# Patient Record
Sex: FEMALE | Race: WHITE | NOT HISPANIC OR LATINO
[De-identification: names, ages, dates, MRNs, and addresses within clinical notes are randomized per-mention and may not be internally consistent; named-entity substitution may affect disease eponyms.]

---

## 2017-05-22 PROBLEM — Z00.00 ENCOUNTER FOR PREVENTIVE HEALTH EXAMINATION: Status: ACTIVE | Noted: 2017-05-22

## 2017-06-09 ENCOUNTER — APPOINTMENT (OUTPATIENT)
Dept: GASTROENTEROLOGY | Facility: CLINIC | Age: 57
End: 2017-06-09

## 2017-06-09 VITALS
WEIGHT: 236 LBS | HEART RATE: 99 BPM | BODY MASS INDEX: 37.93 KG/M2 | HEIGHT: 66 IN | DIASTOLIC BLOOD PRESSURE: 72 MMHG | SYSTOLIC BLOOD PRESSURE: 140 MMHG | OXYGEN SATURATION: 97 % | TEMPERATURE: 98.1 F

## 2017-06-09 DIAGNOSIS — Z82.49 FAMILY HISTORY OF ISCHEMIC HEART DISEASE AND OTHER DISEASES OF THE CIRCULATORY SYSTEM: ICD-10-CM

## 2017-06-09 DIAGNOSIS — Z86.69 PERSONAL HISTORY OF OTHER DISEASES OF THE NERVOUS SYSTEM AND SENSE ORGANS: ICD-10-CM

## 2017-06-09 DIAGNOSIS — K64.9 UNSPECIFIED HEMORRHOIDS: ICD-10-CM

## 2017-06-09 DIAGNOSIS — Z80.8 FAMILY HISTORY OF MALIGNANT NEOPLASM OF OTHER ORGANS OR SYSTEMS: ICD-10-CM

## 2017-06-09 DIAGNOSIS — Z80.1 FAMILY HISTORY OF MALIGNANT NEOPLASM OF TRACHEA, BRONCHUS AND LUNG: ICD-10-CM

## 2017-06-09 DIAGNOSIS — Z86.39 PERSONAL HISTORY OF OTHER ENDOCRINE, NUTRITIONAL AND METABOLIC DISEASE: ICD-10-CM

## 2017-06-09 DIAGNOSIS — E01.0 IODINE-DEFICIENCY RELATED DIFFUSE (ENDEMIC) GOITER: ICD-10-CM

## 2017-06-09 DIAGNOSIS — R10.9 UNSPECIFIED ABDOMINAL PAIN: ICD-10-CM

## 2017-06-09 DIAGNOSIS — M54.9 DORSALGIA, UNSPECIFIED: ICD-10-CM

## 2017-06-09 DIAGNOSIS — R68.89 OTHER GENERAL SYMPTOMS AND SIGNS: ICD-10-CM

## 2017-06-09 DIAGNOSIS — Z80.6 FAMILY HISTORY OF LEUKEMIA: ICD-10-CM

## 2017-06-09 DIAGNOSIS — K58.0 IRRITABLE BOWEL SYNDROME WITH DIARRHEA: ICD-10-CM

## 2017-06-09 DIAGNOSIS — R19.5 OTHER FECAL ABNORMALITIES: ICD-10-CM

## 2017-06-09 DIAGNOSIS — R19.4 CHANGE IN BOWEL HABIT: ICD-10-CM

## 2017-06-09 DIAGNOSIS — R09.82 POSTNASAL DRIP: ICD-10-CM

## 2017-06-09 RX ORDER — BISMUTH SUBSALICYLATE 525 MG/1
TABLET ORAL
Refills: 0 | Status: ACTIVE | COMMUNITY

## 2017-06-09 RX ORDER — LEVOCETIRIZINE DIHYDROCHLORIDE 5 MG/1
5 TABLET ORAL
Qty: 30 | Refills: 0 | Status: ACTIVE | COMMUNITY
Start: 2017-01-24

## 2017-06-09 RX ORDER — TRAZODONE HYDROCHLORIDE 150 MG/1
150 TABLET ORAL
Qty: 30 | Refills: 0 | Status: ACTIVE | COMMUNITY
Start: 2017-01-18

## 2017-06-09 RX ORDER — PRAVASTATIN SODIUM 20 MG/1
20 TABLET ORAL
Qty: 30 | Refills: 0 | Status: ACTIVE | COMMUNITY
Start: 2016-12-30

## 2017-06-09 RX ORDER — SERTRALINE HYDROCHLORIDE 100 MG/1
100 TABLET, FILM COATED ORAL
Refills: 0 | Status: ACTIVE | COMMUNITY

## 2017-06-09 RX ORDER — CYCLOBENZAPRINE HYDROCHLORIDE 10 MG/1
10 TABLET, FILM COATED ORAL
Qty: 60 | Refills: 0 | Status: ACTIVE | COMMUNITY
Start: 2016-08-01

## 2017-06-09 RX ORDER — MONTELUKAST 10 MG/1
10 TABLET, FILM COATED ORAL
Qty: 30 | Refills: 0 | Status: ACTIVE | COMMUNITY
Start: 2017-01-24

## 2017-06-12 ENCOUNTER — RESULT REVIEW (OUTPATIENT)
Age: 57
End: 2017-06-12

## 2017-06-13 LAB
ALBUMIN SERPL ELPH-MCNC: 4.4 G/DL
ALP BLD-CCNC: 134 U/L
ALT SERPL-CCNC: 57 U/L
ANION GAP SERPL CALC-SCNC: 18 MMOL/L
AST SERPL-CCNC: 30 U/L
BASOPHILS # BLD AUTO: 0.04 K/UL
BASOPHILS NFR BLD AUTO: 0.4 %
BILIRUB SERPL-MCNC: 0.2 MG/DL
BUN SERPL-MCNC: 11 MG/DL
CALCIUM SERPL-MCNC: 9.9 MG/DL
CHLORIDE SERPL-SCNC: 99 MMOL/L
CO2 SERPL-SCNC: 24 MMOL/L
CREAT SERPL-MCNC: 0.76 MG/DL
EOSINOPHIL # BLD AUTO: 0.42 K/UL
EOSINOPHIL NFR BLD AUTO: 4 %
GLIADIN IGA SER QL: 5.4 UNITS
GLIADIN IGG SER QL: <5 UNITS
GLIADIN PEPTIDE IGA SER-ACNC: NEGATIVE
GLIADIN PEPTIDE IGG SER-ACNC: NEGATIVE
GLUCOSE SERPL-MCNC: 85 MG/DL
HCT VFR BLD CALC: 40.4 %
HGB BLD-MCNC: 12.6 G/DL
IGA SER QL IEP: 210 MG/DL
IMM GRANULOCYTES NFR BLD AUTO: 0.6 %
LYMPHOCYTES # BLD AUTO: 3.14 K/UL
LYMPHOCYTES NFR BLD AUTO: 29.8 %
MAN DIFF?: NORMAL
MCHC RBC-ENTMCNC: 26.8 PG
MCHC RBC-ENTMCNC: 31.2 GM/DL
MCV RBC AUTO: 86 FL
MONOCYTES # BLD AUTO: 0.55 K/UL
MONOCYTES NFR BLD AUTO: 5.2 %
NEUTROPHILS # BLD AUTO: 6.31 K/UL
NEUTROPHILS NFR BLD AUTO: 60 %
PLATELET # BLD AUTO: 451 K/UL
POTASSIUM SERPL-SCNC: 4.3 MMOL/L
PROT SERPL-MCNC: 8.2 G/DL
RBC # BLD: 4.7 M/UL
RBC # FLD: 15.1 %
SODIUM SERPL-SCNC: 141 MMOL/L
TSH SERPL-ACNC: 0.08 UIU/ML
TTG IGA SER IA-ACNC: <5 UNITS
TTG IGA SER-ACNC: NEGATIVE
TTG IGG SER IA-ACNC: <5 UNITS
TTG IGG SER IA-ACNC: NEGATIVE
WBC # FLD AUTO: 10.52 K/UL

## 2017-06-16 ENCOUNTER — OTHER (OUTPATIENT)
Age: 57
End: 2017-06-16

## 2017-06-16 ENCOUNTER — RX RENEWAL (OUTPATIENT)
Age: 57
End: 2017-06-16

## 2017-06-20 ENCOUNTER — APPOINTMENT (OUTPATIENT)
Dept: GASTROENTEROLOGY | Facility: AMBULATORY MEDICAL SERVICES | Age: 57
End: 2017-06-20

## 2017-06-22 ENCOUNTER — LABORATORY RESULT (OUTPATIENT)
Age: 57
End: 2017-06-22

## 2017-06-28 ENCOUNTER — OTHER (OUTPATIENT)
Age: 57
End: 2017-06-28

## 2017-06-28 ENCOUNTER — RESULT REVIEW (OUTPATIENT)
Age: 57
End: 2017-06-28

## 2017-07-05 ENCOUNTER — LABORATORY RESULT (OUTPATIENT)
Age: 57
End: 2017-07-05

## 2017-07-05 ENCOUNTER — APPOINTMENT (OUTPATIENT)
Dept: GASTROENTEROLOGY | Facility: CLINIC | Age: 57
End: 2017-07-05

## 2017-07-05 VITALS
DIASTOLIC BLOOD PRESSURE: 70 MMHG | WEIGHT: 225 LBS | HEIGHT: 65 IN | SYSTOLIC BLOOD PRESSURE: 126 MMHG | TEMPERATURE: 98.6 F | BODY MASS INDEX: 37.49 KG/M2

## 2017-07-05 RX ORDER — DICLOFENAC SODIUM 100 MG/1
100 TABLET, FILM COATED, EXTENDED RELEASE ORAL
Qty: 30 | Refills: 0 | Status: DISCONTINUED | COMMUNITY
Start: 2017-04-12 | End: 2017-07-05

## 2017-07-05 RX ORDER — SODIUM SULFATE, POTASSIUM SULFATE, MAGNESIUM SULFATE 17.5; 3.13; 1.6 G/ML; G/ML; G/ML
17.5-3.13-1.6 SOLUTION, CONCENTRATE ORAL
Qty: 1 | Refills: 0 | Status: DISCONTINUED | COMMUNITY
Start: 2017-06-16 | End: 2017-07-05

## 2017-07-07 LAB
BAKER'S YEAST AB QL: 12.3 UNITS
BAKER'S YEAST IGA QL IA: 13.8 UNITS
BAKER'S YEAST IGA QN IA: NEGATIVE
BAKER'S YEAST IGG QN IA: NEGATIVE
BASOPHILS # BLD AUTO: 0.05 K/UL
BASOPHILS NFR BLD AUTO: 0.5 %
CRP SERPL-MCNC: 1 MG/DL
EOSINOPHIL # BLD AUTO: 0.78 K/UL
EOSINOPHIL NFR BLD AUTO: 7.3 %
ERYTHROCYTE [SEDIMENTATION RATE] IN BLOOD BY WESTERGREN METHOD: 35 MM/HR
HCT VFR BLD CALC: 40 %
HGB BLD-MCNC: 12.8 G/DL
IMM GRANULOCYTES NFR BLD AUTO: 0.2 %
LYMPHOCYTES # BLD AUTO: 3.37 K/UL
LYMPHOCYTES NFR BLD AUTO: 31.6 %
MAN DIFF?: NORMAL
MCHC RBC-ENTMCNC: 27.4 PG
MCHC RBC-ENTMCNC: 32 GM/DL
MCV RBC AUTO: 85.7 FL
MONOCYTES # BLD AUTO: 0.76 K/UL
MONOCYTES NFR BLD AUTO: 7.1 %
MPO AB + PR3 PNL SER: NORMAL
NEUTROPHILS # BLD AUTO: 5.67 K/UL
NEUTROPHILS NFR BLD AUTO: 53.3 %
PLATELET # BLD AUTO: 341 K/UL
RBC # BLD: 4.67 M/UL
RBC # FLD: 14.6 %
WBC # FLD AUTO: 10.65 K/UL

## 2017-08-28 ENCOUNTER — APPOINTMENT (OUTPATIENT)
Dept: GASTROENTEROLOGY | Facility: CLINIC | Age: 57
End: 2017-08-28
Payer: COMMERCIAL

## 2017-08-28 VITALS
DIASTOLIC BLOOD PRESSURE: 80 MMHG | WEIGHT: 227 LBS | HEIGHT: 65.5 IN | BODY MASS INDEX: 37.37 KG/M2 | SYSTOLIC BLOOD PRESSURE: 122 MMHG | TEMPERATURE: 98.3 F

## 2017-08-28 PROCEDURE — 99213 OFFICE O/P EST LOW 20 MIN: CPT

## 2017-08-28 RX ORDER — MESALAMINE 1.2 G/1
1.2 TABLET, DELAYED RELEASE ORAL DAILY
Qty: 360 | Refills: 1 | Status: DISCONTINUED | COMMUNITY
Start: 2017-07-05 | End: 2017-08-28

## 2017-09-18 ENCOUNTER — RX RENEWAL (OUTPATIENT)
Age: 57
End: 2017-09-18

## 2017-10-16 ENCOUNTER — RX RENEWAL (OUTPATIENT)
Age: 57
End: 2017-10-16

## 2017-10-24 ENCOUNTER — APPOINTMENT (OUTPATIENT)
Dept: GASTROENTEROLOGY | Facility: CLINIC | Age: 57
End: 2017-10-24

## 2017-11-01 ENCOUNTER — APPOINTMENT (OUTPATIENT)
Dept: GASTROENTEROLOGY | Facility: CLINIC | Age: 57
End: 2017-11-01

## 2017-11-28 ENCOUNTER — APPOINTMENT (OUTPATIENT)
Dept: GASTROENTEROLOGY | Facility: CLINIC | Age: 57
End: 2017-11-28
Payer: COMMERCIAL

## 2017-11-28 VITALS
WEIGHT: 239 LBS | DIASTOLIC BLOOD PRESSURE: 80 MMHG | HEIGHT: 65.5 IN | BODY MASS INDEX: 39.34 KG/M2 | TEMPERATURE: 98.8 F | SYSTOLIC BLOOD PRESSURE: 126 MMHG

## 2017-11-28 DIAGNOSIS — R19.7 DIARRHEA, UNSPECIFIED: ICD-10-CM

## 2017-11-28 PROCEDURE — 36415 COLL VENOUS BLD VENIPUNCTURE: CPT

## 2017-11-28 PROCEDURE — 99214 OFFICE O/P EST MOD 30 MIN: CPT | Mod: 25

## 2017-11-29 LAB
BASOPHILS # BLD AUTO: 0.04 K/UL
BASOPHILS NFR BLD AUTO: 0.5 %
CRP SERPL-MCNC: 4.3 MG/DL
EOSINOPHIL # BLD AUTO: 0.53 K/UL
EOSINOPHIL NFR BLD AUTO: 6.8 %
ERYTHROCYTE [SEDIMENTATION RATE] IN BLOOD BY WESTERGREN METHOD: 45 MM/HR
HCT VFR BLD CALC: 37.9 %
HGB BLD-MCNC: 12.2 G/DL
IMM GRANULOCYTES NFR BLD AUTO: 0.3 %
LYMPHOCYTES # BLD AUTO: 2.36 K/UL
LYMPHOCYTES NFR BLD AUTO: 30.2 %
MAN DIFF?: NORMAL
MCHC RBC-ENTMCNC: 26.9 PG
MCHC RBC-ENTMCNC: 32.2 GM/DL
MCV RBC AUTO: 83.7 FL
MONOCYTES # BLD AUTO: 0.72 K/UL
MONOCYTES NFR BLD AUTO: 9.2 %
NEUTROPHILS # BLD AUTO: 4.14 K/UL
NEUTROPHILS NFR BLD AUTO: 53 %
PLATELET # BLD AUTO: 403 K/UL
RBC # BLD: 4.53 M/UL
RBC # FLD: 14.9 %
TSH SERPL-ACNC: 0.08 UIU/ML
WBC # FLD AUTO: 7.81 K/UL

## 2017-11-30 ENCOUNTER — RESULT REVIEW (OUTPATIENT)
Age: 57
End: 2017-11-30

## 2017-12-01 ENCOUNTER — RESULT REVIEW (OUTPATIENT)
Age: 57
End: 2017-12-01

## 2017-12-06 LAB
C DIFF TOX GENS STL QL NAA+PROBE: NORMAL
CALPROTECTIN FECAL: 305 UG/G
CDIFF BY PCR: NOT DETECTED
GI PCR PANEL, STOOL: NORMAL

## 2017-12-07 LAB — LACTOFERRIN STL-MCNC: 83

## 2017-12-18 ENCOUNTER — MESSAGE (OUTPATIENT)
Age: 57
End: 2017-12-18

## 2017-12-19 ENCOUNTER — MESSAGE (OUTPATIENT)
Age: 57
End: 2017-12-19

## 2017-12-21 ENCOUNTER — MESSAGE (OUTPATIENT)
Age: 57
End: 2017-12-21

## 2017-12-30 ENCOUNTER — RX RENEWAL (OUTPATIENT)
Age: 57
End: 2017-12-30

## 2018-01-02 ENCOUNTER — APPOINTMENT (OUTPATIENT)
Dept: GASTROENTEROLOGY | Facility: CLINIC | Age: 58
End: 2018-01-02
Payer: COMMERCIAL

## 2018-01-02 VITALS
DIASTOLIC BLOOD PRESSURE: 70 MMHG | OXYGEN SATURATION: 97 % | HEART RATE: 84 BPM | WEIGHT: 239 LBS | BODY MASS INDEX: 39.34 KG/M2 | SYSTOLIC BLOOD PRESSURE: 110 MMHG | TEMPERATURE: 98.5 F | HEIGHT: 65.5 IN

## 2018-01-02 PROCEDURE — 99214 OFFICE O/P EST MOD 30 MIN: CPT

## 2018-01-02 RX ORDER — DICLOFENAC SODIUM 75 MG
TABLET, DELAYED RELEASE (ENTERIC COATED) ORAL
Refills: 0 | Status: DISCONTINUED | COMMUNITY
End: 2018-01-02

## 2018-01-02 RX ORDER — LOPERAMIDE HCL 2 MG
CAPSULE ORAL
Refills: 0 | Status: DISCONTINUED | COMMUNITY
End: 2018-01-02

## 2018-01-02 RX ORDER — PREDNISONE 10 MG/1
10 TABLET ORAL
Qty: 120 | Refills: 1 | Status: DISCONTINUED | COMMUNITY
Start: 2017-12-18 | End: 2018-01-02

## 2018-02-02 ENCOUNTER — APPOINTMENT (OUTPATIENT)
Dept: GASTROENTEROLOGY | Facility: CLINIC | Age: 58
End: 2018-02-02
Payer: COMMERCIAL

## 2018-02-02 ENCOUNTER — LABORATORY RESULT (OUTPATIENT)
Age: 58
End: 2018-02-02

## 2018-02-02 VITALS
SYSTOLIC BLOOD PRESSURE: 120 MMHG | WEIGHT: 240 LBS | OXYGEN SATURATION: 98 % | BODY MASS INDEX: 39.51 KG/M2 | TEMPERATURE: 98.5 F | DIASTOLIC BLOOD PRESSURE: 70 MMHG | HEIGHT: 65.5 IN | HEART RATE: 100 BPM

## 2018-02-02 PROCEDURE — 36415 COLL VENOUS BLD VENIPUNCTURE: CPT

## 2018-02-02 PROCEDURE — 99213 OFFICE O/P EST LOW 20 MIN: CPT | Mod: 25

## 2018-02-02 RX ORDER — METFORMIN HYDROCHLORIDE 1000 MG/1
1000 TABLET, COATED ORAL
Refills: 0 | Status: DISCONTINUED | COMMUNITY
End: 2018-02-02

## 2018-02-02 RX ORDER — LANCETS 33 GAUGE
EACH MISCELLANEOUS
Qty: 100 | Refills: 0 | Status: DISCONTINUED | COMMUNITY
Start: 2016-08-19 | End: 2018-02-02

## 2018-02-02 RX ORDER — NALTREXONE HYDROCHLORIDE 50 MG/1
TABLET, FILM COATED ORAL
Refills: 0 | Status: DISCONTINUED | COMMUNITY
End: 2018-02-02

## 2018-02-02 RX ORDER — BLOOD SUGAR DIAGNOSTIC
STRIP MISCELLANEOUS
Qty: 50 | Refills: 0 | Status: DISCONTINUED | COMMUNITY
Start: 2017-02-07 | End: 2018-02-02

## 2018-02-05 LAB
BASOPHILS # BLD AUTO: 0.04 K/UL
BASOPHILS NFR BLD AUTO: 0.4 %
CRP SERPL-MCNC: 2.1 MG/DL
EOSINOPHIL # BLD AUTO: 0.32 K/UL
EOSINOPHIL NFR BLD AUTO: 3.4 %
ERYTHROCYTE [SEDIMENTATION RATE] IN BLOOD BY WESTERGREN METHOD: 42 MM/HR
FERRITIN SERPL-MCNC: 64 NG/ML
FOLATE SERPL-MCNC: 16.8 NG/ML
HCT VFR BLD CALC: 41.3 %
HGB BLD-MCNC: 12.5 G/DL
IMM GRANULOCYTES NFR BLD AUTO: 0.1 %
IRON SATN MFR SERPL: 12 %
IRON SERPL-MCNC: 41 UG/DL
LYMPHOCYTES # BLD AUTO: 2.92 K/UL
LYMPHOCYTES NFR BLD AUTO: 30.9 %
MAN DIFF?: NORMAL
MCHC RBC-ENTMCNC: 25.4 PG
MCHC RBC-ENTMCNC: 30.3 GM/DL
MCV RBC AUTO: 83.8 FL
MONOCYTES # BLD AUTO: 0.56 K/UL
MONOCYTES NFR BLD AUTO: 5.9 %
NEUTROPHILS # BLD AUTO: 5.59 K/UL
NEUTROPHILS NFR BLD AUTO: 59.3 %
PLATELET # BLD AUTO: 383 K/UL
RBC # BLD: 4.93 M/UL
RBC # FLD: 15.1 %
TIBC SERPL-MCNC: 331 UG/DL
UIBC SERPL-MCNC: 290 UG/DL
VIT B12 SERPL-MCNC: 392 PG/ML
WBC # FLD AUTO: 9.44 K/UL

## 2018-06-18 ENCOUNTER — APPOINTMENT (OUTPATIENT)
Dept: GASTROENTEROLOGY | Facility: CLINIC | Age: 58
End: 2018-06-18
Payer: COMMERCIAL

## 2018-06-18 VITALS
TEMPERATURE: 98.5 F | SYSTOLIC BLOOD PRESSURE: 140 MMHG | BODY MASS INDEX: 40 KG/M2 | HEIGHT: 65.5 IN | WEIGHT: 243 LBS | DIASTOLIC BLOOD PRESSURE: 60 MMHG | OXYGEN SATURATION: 96 % | HEART RATE: 90 BPM

## 2018-06-18 PROCEDURE — 36415 COLL VENOUS BLD VENIPUNCTURE: CPT

## 2018-06-18 PROCEDURE — 99213 OFFICE O/P EST LOW 20 MIN: CPT | Mod: 25

## 2018-06-18 RX ORDER — METFORMIN ER 500 MG 500 MG/1
500 TABLET ORAL
Qty: 120 | Refills: 0 | Status: DISCONTINUED | COMMUNITY
Start: 2017-12-08 | End: 2018-06-18

## 2018-06-18 RX ORDER — MULTIVITAMIN
TABLET ORAL
Refills: 0 | Status: ACTIVE | COMMUNITY

## 2018-06-19 LAB
ALBUMIN SERPL ELPH-MCNC: 4.2 G/DL
ALP BLD-CCNC: 93 U/L
ALT SERPL-CCNC: 18 U/L
ANION GAP SERPL CALC-SCNC: 14 MMOL/L
AST SERPL-CCNC: 23 U/L
BASOPHILS # BLD AUTO: 0.05 K/UL
BASOPHILS NFR BLD AUTO: 0.6 %
BILIRUB SERPL-MCNC: 0.3 MG/DL
BUN SERPL-MCNC: 9 MG/DL
CALCIUM SERPL-MCNC: 10.2 MG/DL
CHLORIDE SERPL-SCNC: 105 MMOL/L
CO2 SERPL-SCNC: 28 MMOL/L
CREAT SERPL-MCNC: 0.9 MG/DL
CRP SERPL-MCNC: 3.9 MG/DL
EOSINOPHIL # BLD AUTO: 0.35 K/UL
EOSINOPHIL NFR BLD AUTO: 3.9 %
ERYTHROCYTE [SEDIMENTATION RATE] IN BLOOD BY WESTERGREN METHOD: 48 MM/HR
FERRITIN SERPL-MCNC: 53 NG/ML
FOLATE SERPL-MCNC: >20 NG/ML
GLUCOSE SERPL-MCNC: 84 MG/DL
HCT VFR BLD CALC: 41.9 %
HGB BLD-MCNC: 12.5 G/DL
IMM GRANULOCYTES NFR BLD AUTO: 0.2 %
IRON SATN MFR SERPL: 12 %
IRON SERPL-MCNC: 35 UG/DL
LYMPHOCYTES # BLD AUTO: 2.99 K/UL
LYMPHOCYTES NFR BLD AUTO: 33 %
MAN DIFF?: NORMAL
MCHC RBC-ENTMCNC: 25 PG
MCHC RBC-ENTMCNC: 29.8 GM/DL
MCV RBC AUTO: 83.8 FL
MONOCYTES # BLD AUTO: 0.74 K/UL
MONOCYTES NFR BLD AUTO: 8.2 %
NEUTROPHILS # BLD AUTO: 4.92 K/UL
NEUTROPHILS NFR BLD AUTO: 54.1 %
PLATELET # BLD AUTO: 379 K/UL
POTASSIUM SERPL-SCNC: 5.1 MMOL/L
PROT SERPL-MCNC: 8.1 G/DL
RBC # BLD: 5 M/UL
RBC # FLD: 16.9 %
SODIUM SERPL-SCNC: 147 MMOL/L
TIBC SERPL-MCNC: 297 UG/DL
UIBC SERPL-MCNC: 262 UG/DL
VIT B12 SERPL-MCNC: 617 PG/ML
WBC # FLD AUTO: 9.07 K/UL

## 2018-07-18 ENCOUNTER — APPOINTMENT (OUTPATIENT)
Dept: GASTROENTEROLOGY | Facility: AMBULATORY MEDICAL SERVICES | Age: 58
End: 2018-07-18
Payer: COMMERCIAL

## 2018-07-18 PROCEDURE — 45380 COLONOSCOPY AND BIOPSY: CPT

## 2018-08-31 ENCOUNTER — APPOINTMENT (OUTPATIENT)
Dept: GASTROENTEROLOGY | Facility: CLINIC | Age: 58
End: 2018-08-31
Payer: COMMERCIAL

## 2018-08-31 VITALS
OXYGEN SATURATION: 98 % | WEIGHT: 254 LBS | SYSTOLIC BLOOD PRESSURE: 142 MMHG | TEMPERATURE: 98.9 F | BODY MASS INDEX: 41.81 KG/M2 | HEART RATE: 92 BPM | HEIGHT: 65.5 IN | DIASTOLIC BLOOD PRESSURE: 80 MMHG

## 2018-08-31 PROCEDURE — 99215 OFFICE O/P EST HI 40 MIN: CPT | Mod: 25

## 2018-08-31 PROCEDURE — 36415 COLL VENOUS BLD VENIPUNCTURE: CPT

## 2018-08-31 RX ORDER — SODIUM SULFATE, POTASSIUM SULFATE, MAGNESIUM SULFATE 17.5; 3.13; 1.6 G/ML; G/ML; G/ML
17.5-3.13-1.6 SOLUTION, CONCENTRATE ORAL
Qty: 1 | Refills: 0 | Status: DISCONTINUED | COMMUNITY
Start: 2018-06-18 | End: 2018-08-31

## 2018-09-05 LAB
ALBUMIN SERPL ELPH-MCNC: 4.2 G/DL
ALP BLD-CCNC: 100 U/L
ALT SERPL-CCNC: 22 U/L
ANION GAP SERPL CALC-SCNC: 14 MMOL/L
AST SERPL-CCNC: 27 U/L
BASOPHILS # BLD AUTO: 0.04 K/UL
BASOPHILS NFR BLD AUTO: 0.4 %
BILIRUB SERPL-MCNC: 0.2 MG/DL
BUN SERPL-MCNC: 11 MG/DL
CALCIUM SERPL-MCNC: 9.9 MG/DL
CHLORIDE SERPL-SCNC: 100 MMOL/L
CO2 SERPL-SCNC: 29 MMOL/L
CREAT SERPL-MCNC: 0.75 MG/DL
CRP SERPL-MCNC: 2.13 MG/DL
EOSINOPHIL # BLD AUTO: 0.43 K/UL
EOSINOPHIL NFR BLD AUTO: 4.6 %
ERYTHROCYTE [SEDIMENTATION RATE] IN BLOOD BY WESTERGREN METHOD: 34 MM/HR
FERRITIN SERPL-MCNC: 33 NG/ML
FOLATE SERPL-MCNC: >20 NG/ML
GLUCOSE SERPL-MCNC: 105 MG/DL
HBV CORE IGG+IGM SER QL: NONREACTIVE
HBV SURFACE AB SER QL: NONREACTIVE
HBV SURFACE AG SER QL: NONREACTIVE
HCT VFR BLD CALC: 44.3 %
HCV AB SER QL: NONREACTIVE
HCV S/CO RATIO: 0.15 S/CO
HGB BLD-MCNC: 13.7 G/DL
IMM GRANULOCYTES NFR BLD AUTO: 0.2 %
IRON SATN MFR SERPL: 9 %
IRON SERPL-MCNC: 36 UG/DL
LYMPHOCYTES # BLD AUTO: 2.81 K/UL
LYMPHOCYTES NFR BLD AUTO: 29.9 %
M TB IFN-G BLD-IMP: NEGATIVE
MAN DIFF?: NORMAL
MCHC RBC-ENTMCNC: 25.8 PG
MCHC RBC-ENTMCNC: 30.9 GM/DL
MCV RBC AUTO: 83.4 FL
MONOCYTES # BLD AUTO: 0.61 K/UL
MONOCYTES NFR BLD AUTO: 6.5 %
NEUTROPHILS # BLD AUTO: 5.48 K/UL
NEUTROPHILS NFR BLD AUTO: 58.4 %
PLATELET # BLD AUTO: 338 K/UL
POTASSIUM SERPL-SCNC: 4.4 MMOL/L
PROT SERPL-MCNC: 8.2 G/DL
QUANTIFERON TB PLUS MITOGEN MINUS NIL: 8.61 IU/ML
QUANTIFERON TB PLUS NIL: 0.02 IU/ML
QUANTIFERON TB PLUS TB1 MINUS NIL: 0 IU/ML
QUANTIFERON TB PLUS TB2 MINUS NIL: 0 IU/ML
RBC # BLD: 5.31 M/UL
RBC # FLD: 16.6 %
SODIUM SERPL-SCNC: 143 MMOL/L
TIBC SERPL-MCNC: 387 UG/DL
UIBC SERPL-MCNC: 351 UG/DL
VIT B12 SERPL-MCNC: 528 PG/ML
WBC # FLD AUTO: 9.39 K/UL

## 2018-10-19 ENCOUNTER — APPOINTMENT (OUTPATIENT)
Dept: GASTROENTEROLOGY | Facility: CLINIC | Age: 58
End: 2018-10-19
Payer: COMMERCIAL

## 2018-10-19 VITALS
BODY MASS INDEX: 42.8 KG/M2 | WEIGHT: 260 LBS | OXYGEN SATURATION: 97 % | SYSTOLIC BLOOD PRESSURE: 118 MMHG | HEIGHT: 65.5 IN | DIASTOLIC BLOOD PRESSURE: 70 MMHG | HEART RATE: 90 BPM | TEMPERATURE: 98.3 F

## 2018-10-19 DIAGNOSIS — R14.0 ABDOMINAL DISTENSION (GASEOUS): ICD-10-CM

## 2018-10-19 DIAGNOSIS — R19.4 CHANGE IN BOWEL HABIT: ICD-10-CM

## 2018-10-19 PROCEDURE — 99215 OFFICE O/P EST HI 40 MIN: CPT

## 2018-10-19 RX ORDER — EMPAGLIFLOZIN 10 MG/1
10 TABLET, FILM COATED ORAL
Refills: 0 | Status: DISCONTINUED | COMMUNITY
End: 2018-10-19

## 2018-10-23 ENCOUNTER — MESSAGE (OUTPATIENT)
Age: 58
End: 2018-10-23

## 2018-10-24 ENCOUNTER — FORM ENCOUNTER (OUTPATIENT)
Age: 58
End: 2018-10-24

## 2018-10-25 ENCOUNTER — OUTPATIENT (OUTPATIENT)
Dept: OUTPATIENT SERVICES | Facility: HOSPITAL | Age: 58
LOS: 1 days | End: 2018-10-25
Payer: COMMERCIAL

## 2018-10-25 ENCOUNTER — APPOINTMENT (OUTPATIENT)
Dept: CT IMAGING | Facility: CLINIC | Age: 58
End: 2018-10-25
Payer: COMMERCIAL

## 2018-10-25 DIAGNOSIS — Z00.8 ENCOUNTER FOR OTHER GENERAL EXAMINATION: ICD-10-CM

## 2018-10-25 PROCEDURE — 74177 CT ABD & PELVIS W/CONTRAST: CPT

## 2018-10-25 PROCEDURE — 82565 ASSAY OF CREATININE: CPT

## 2018-10-25 PROCEDURE — 74177 CT ABD & PELVIS W/CONTRAST: CPT | Mod: 26

## 2018-10-29 ENCOUNTER — MESSAGE (OUTPATIENT)
Age: 58
End: 2018-10-29

## 2018-10-30 ENCOUNTER — MESSAGE (OUTPATIENT)
Age: 58
End: 2018-10-30

## 2018-11-17 ENCOUNTER — RX RENEWAL (OUTPATIENT)
Age: 58
End: 2018-11-17

## 2018-11-19 ENCOUNTER — APPOINTMENT (OUTPATIENT)
Dept: GASTROENTEROLOGY | Facility: CLINIC | Age: 58
End: 2018-11-19
Payer: COMMERCIAL

## 2018-11-19 VITALS
TEMPERATURE: 98.51 F | HEART RATE: 101 BPM | HEIGHT: 65.5 IN | OXYGEN SATURATION: 94 % | SYSTOLIC BLOOD PRESSURE: 120 MMHG | DIASTOLIC BLOOD PRESSURE: 70 MMHG

## 2018-11-19 PROCEDURE — 36415 COLL VENOUS BLD VENIPUNCTURE: CPT

## 2018-11-19 PROCEDURE — 99213 OFFICE O/P EST LOW 20 MIN: CPT | Mod: 25

## 2018-11-21 LAB
ALBUMIN SERPL ELPH-MCNC: 4.3 G/DL
ALP BLD-CCNC: 96 U/L
ALT SERPL-CCNC: 37 U/L
ANION GAP SERPL CALC-SCNC: 12 MMOL/L
AST SERPL-CCNC: 37 U/L
BASOPHILS # BLD AUTO: 0.03 K/UL
BASOPHILS NFR BLD AUTO: 0.3 %
BILIRUB SERPL-MCNC: 0.2 MG/DL
BUN SERPL-MCNC: 8 MG/DL
CALCIUM SERPL-MCNC: 9.5 MG/DL
CHLORIDE SERPL-SCNC: 101 MMOL/L
CO2 SERPL-SCNC: 28 MMOL/L
CREAT SERPL-MCNC: 0.63 MG/DL
CRP SERPL-MCNC: 1.57 MG/DL
EOSINOPHIL # BLD AUTO: 0.34 K/UL
EOSINOPHIL NFR BLD AUTO: 3.8 %
ERYTHROCYTE [SEDIMENTATION RATE] IN BLOOD BY WESTERGREN METHOD: 27 MM/HR
FERRITIN SERPL-MCNC: 84 NG/ML
FOLATE SERPL-MCNC: >20 NG/ML
GLUCOSE SERPL-MCNC: 145 MG/DL
HCT VFR BLD CALC: 42.8 %
HGB BLD-MCNC: 12.9 G/DL
IMM GRANULOCYTES NFR BLD AUTO: 0.2 %
IRON SATN MFR SERPL: 14 %
IRON SERPL-MCNC: 46 UG/DL
LYMPHOCYTES # BLD AUTO: 3.08 K/UL
LYMPHOCYTES NFR BLD AUTO: 34.1 %
MAN DIFF?: NORMAL
MCHC RBC-ENTMCNC: 25.4 PG
MCHC RBC-ENTMCNC: 30.1 GM/DL
MCV RBC AUTO: 84.3 FL
MONOCYTES # BLD AUTO: 0.56 K/UL
MONOCYTES NFR BLD AUTO: 6.2 %
NEUTROPHILS # BLD AUTO: 5.01 K/UL
NEUTROPHILS NFR BLD AUTO: 55.4 %
PLATELET # BLD AUTO: 274 K/UL
POTASSIUM SERPL-SCNC: 3.9 MMOL/L
PROT SERPL-MCNC: 7.8 G/DL
RBC # BLD: 5.08 M/UL
RBC # FLD: 17 %
SODIUM SERPL-SCNC: 140 MMOL/L
TIBC SERPL-MCNC: 337 UG/DL
UIBC SERPL-MCNC: 291 UG/DL
VIT B12 SERPL-MCNC: 579 PG/ML
WBC # FLD AUTO: 9.04 K/UL

## 2019-04-10 ENCOUNTER — APPOINTMENT (OUTPATIENT)
Dept: GASTROENTEROLOGY | Facility: CLINIC | Age: 59
End: 2019-04-10
Payer: COMMERCIAL

## 2019-04-10 VITALS
OXYGEN SATURATION: 97 % | BODY MASS INDEX: 42.8 KG/M2 | SYSTOLIC BLOOD PRESSURE: 110 MMHG | WEIGHT: 260 LBS | HEART RATE: 93 BPM | HEIGHT: 65.5 IN | TEMPERATURE: 98.2 F | DIASTOLIC BLOOD PRESSURE: 68 MMHG

## 2019-04-10 PROCEDURE — 99213 OFFICE O/P EST LOW 20 MIN: CPT

## 2019-04-10 RX ORDER — ADALIMUMAB 80MG/0.8ML
80 KIT SUBCUTANEOUS
Qty: 1 | Refills: 0 | Status: DISCONTINUED | COMMUNITY
Start: 2018-10-19 | End: 2019-04-10

## 2019-04-10 RX ORDER — TRAMADOL HYDROCHLORIDE 25 MG/1
TABLET, COATED ORAL
Refills: 0 | Status: DISCONTINUED | COMMUNITY
End: 2019-04-10

## 2019-04-10 RX ORDER — BUDESONIDE 1 MG/2ML
INHALANT ORAL
Refills: 0 | Status: DISCONTINUED | COMMUNITY
End: 2019-04-10

## 2019-04-10 RX ORDER — BUDESONIDE 32 UG/1
32 SPRAY, METERED NASAL
Refills: 0 | Status: ACTIVE | COMMUNITY

## 2019-04-10 RX ORDER — FLUTICASONE PROPIONATE 50 UG/1
50 SPRAY, METERED NASAL
Qty: 16 | Refills: 0 | Status: DISCONTINUED | COMMUNITY
Start: 2018-03-16 | End: 2019-04-10

## 2019-04-14 NOTE — PHYSICAL EXAM
[General Appearance - In No Acute Distress] : in no acute distress [General Appearance - Alert] : alert [Neck Appearance] : the appearance of the neck was normal [Neck Cervical Mass (___cm)] : no neck mass was observed [Jugular Venous Distention Increased] : there was no jugular-venous distention [Thyroid Diffuse Enlargement] : the thyroid was not enlarged [Thyroid Nodule] : there were no palpable thyroid nodules [Auscultation Breath Sounds / Voice Sounds] : lungs were clear to auscultation bilaterally [Heart Rate And Rhythm] : heart rate was normal and rhythm regular [Murmurs] : no murmurs [Heart Sounds Gallop] : no gallops [Heart Sounds] : normal S1 and S2 [Heart Sounds Pericardial Friction Rub] : no pericardial rub [Edema] : there was no peripheral edema [Bowel Sounds] : normal bowel sounds [Abdomen Soft] : soft [Abdomen Mass (___ Cm)] : no abdominal mass palpated [] : no hepato-splenomegaly [No CVA Tenderness] : no ~M costovertebral angle tenderness [No Spinal Tenderness] : no spinal tenderness [Affect] : the affect was normal [Oriented To Time, Place, And Person] : oriented to person, place, and time [Impaired Insight] : insight and judgment were intact [FreeTextEntry1] : obese, mild LLQ tenderness

## 2019-04-14 NOTE — CONSULT LETTER
[FreeTextEntry1] : Dear Dr. Shelton Leavitt ,\par \par I had the pleasure of seeing your patient MARY ALICE REMY in the office today.  My office note is attached.\par \par Thank you very much for allowing me to participate in the care of your patient.\par \par Sincerely,\par \par Vince Lizarraga M.D., FACG, FACP\par Director, Celiac Program at [facility]\par  of Medicine\par [employer] at [facility] Practice Director,\par [facility] Gastroenterology/Internal Medicine at [place]\par [address]\[place] [address]\par Tel: [phone]\par Email: [email or web address]

## 2019-04-14 NOTE — REVIEW OF SYSTEMS
[As Noted in HPI] : as noted in HPI [Negative] : Heme/Lymph [FreeTextEntry4] : current sinus infection [FreeTextEntry9] : lower back pain

## 2019-04-14 NOTE — ASSESSMENT
[FreeTextEntry1] : Patient with Crohn's colitis on Humira.\par \par We will draw labs at trough levels of Humira in one and a half weeks. These will include Humira levels and antibodies, CBC, Chem-matthias, TSH, ESR, C-reactive protein, iron studies, B12, folate, vitamin D.\par \par I have renewed hydrocortisone cream for her hemorrhoids.\par \par Patient will return to see me in July. She is due for a colonoscopy at that time.\par \par \par Note from 11/19/18 - Patient with Crohn's colitis who has begun Humira. She is currently doing well.\par \par Patient will continue her current medications.\par \par Blood work was sent for CBC, chem pack, sedimentation rate, C-reactive protein, iron studies, B12, folate.\par \par Patient will return to see me in 3 months.\par \par \par Plan from 10/19/18 - Patient with Crohn's colitis with active inflammation on previous colonoscopy and elevated inflammatory markers on recent blood work. She has had a worsening of her symptoms. At her last visit, we had discussed the possibility of beginning a biologic treatment but she decided to hold off at that time. The patient reports that her gynecologist had concerns regarding impact of the Crohn's disease on gynecologic organs although the patient has no symptoms referable to the urinary tract or any symptoms of fecal output vaginally. The patient has left lower quadrant tenderness on exam.\par \par A long discussion with the patient regarding biologic use. We agreed to begin Humira after discussion regarding the possible risks. We have begun the process of establishing the patient with a specialty pharmacy and beginning insurance verification.\par \par Patient was sent for a CT scan of the abdomen and pelvis to rule out any fistula or abscess.\par \par \par Plan from 8/31/18 - Patient with evidence of Crohn's colitis on colonoscopy with the presence of granulomas on multiple biopsies. The patient is doing well clinically on Apriso bypass active inflammation throughout the colon and elevated inflammatory markers on blood work.\par \par I had a long detailed discussion with the patient regarding our options at this point. We could choose to continue Apriso and altered treatment only if her symptoms change or she showed evidence of a flareup. Alternatively, we can consider a biologic as the patient does have active inflammation.\par \par Blood work was sent for CBC, chem PAC, sedimentation rate, C-reactive protein, B12, folate, iron studies, hepatitis B. and C. serologies, quantiferrin gold.\par \par At the present time, the patient will continue Apriso.\par \par We will repeat a colonoscopy in one year that is July 2019.\par \par Patient will return to see me in 2 months.\par \par \par Plan from 6/18/18 - Patient with ulcerative colitis doing well clinically.\par \par A colonoscopy has been scheduled. The risks, benefits, alternatives, and limitations of the procedure, including the possibility of missed lesions, were explained.\par \par Blood work was sent for CBC, chem pack, sedimentation rate, C-reactive protein, iron studies, B12, folate.\par \par \par Plan from 2/2/18 - Patient doing better after a flareup of her ulcerative colitis. She did not require prednisone. She is now doing well on Apriso.\par \par Patient will continue Apriso 4 tablets a day.\par \par Blood work was sent for CBC, chem pack, sedimentation rate, C-reactive protein, iron studies, B12, folate.\par \par Patient will return to see me in 3 months. She is due for colonoscopy in June.\par \par \par Plan from 1/2/18 - Patient with a flareup of ulcerative colitis. She is starting to improve on Apriso. It is possible that the patient's hyperthyroidism is partially contributing to the diarrhea symptoms.\par \par Patient will continue Apriso for tablets a day.\par \par At this point, the patient was advised to stay off of prednisone.\par \par Patient was given hydrocortisone cream 2.5% to apply to hemorrhoids.\par \par Patient was advised to proceed with radioactive iodine to treat the hyperthyroidism.\par \par Patient will return to see me in one month. We will plan on a colonoscopy in June.\par \par \par Plan from 11/28/17 - Patient with probable ulcerative colitis who has symptoms of diarrhea with bleeding despite being on Lialda.  \par \par Stool studies will be sent for PCR testing, C. diff, lactoferrin, and calprotectin.\par \par Blood work was sent for CBC, sedimentation rate, C-reactive protein, TFTs.\par \par If the above are consistent with ulcerative colitis, the patient will require a short course of steroids.\par \par \par Plan from 8/28/17 - Patient with a pancolitis on colonoscopy. We do not have a clear understanding of whether or not she has true inflammatory bowel disease. Her markers are negative.\par \par Patient will continue on Lialda 4.8 g a day.\par \par Patient will return to see me in one month.\par \par Colonoscopy in June 2018.\par \par \par Plan from 7/5/17 - Patient with pancolitis on colonoscopy. The biopsy findings along with the positive calprotectin and lactoferrin suggests the possibility of IBD. The patient had mild elevation of her LFTs but these have normalized.\par \par Blood work was sent for IBD serology, CBC, sedimentation rate, C-reactive protein.\par \par Patient was started on Lialda 4.8 g a day.\par \par We will repeat a colonoscopy in one year that is June 2018.\par \par Patient has been following with her endocrinologist regarding her hyperthyroidism.\par \par Patient will return to see me in one month.

## 2019-04-22 ENCOUNTER — LABORATORY RESULT (OUTPATIENT)
Age: 59
End: 2019-04-22

## 2019-04-29 LAB
25(OH)D3 SERPL-MCNC: 14.3 NG/ML
ALBUMIN SERPL ELPH-MCNC: 4 G/DL
ALP BLD-CCNC: 110 U/L
ALT SERPL-CCNC: 26 U/L
ANION GAP SERPL CALC-SCNC: 12 MMOL/L
ANTIBODIES TO ADALIMUMAB (ATA) CONCENTRATION: 19.6 U/ML
AST SERPL-CCNC: 29 U/L
BASOPHILS # BLD AUTO: 0.07 K/UL
BASOPHILS NFR BLD AUTO: 0.7 %
BILIRUB SERPL-MCNC: 0.2 MG/DL
BUN SERPL-MCNC: 8 MG/DL
CALCIUM SERPL-MCNC: 9.4 MG/DL
CHLORIDE SERPL-SCNC: 101 MMOL/L
CO2 SERPL-SCNC: 27 MMOL/L
CREAT SERPL-MCNC: 0.6 MG/DL
CRP SERPL-MCNC: 3.14 MG/DL
EOSINOPHIL # BLD AUTO: 0.43 K/UL
EOSINOPHIL NFR BLD AUTO: 4 %
ERYTHROCYTE [SEDIMENTATION RATE] IN BLOOD BY WESTERGREN METHOD: 39 MM/HR
FERRITIN SERPL-MCNC: 118 NG/ML
FOLATE SERPL-MCNC: >20 NG/ML
GLUCOSE SERPL-MCNC: 182 MG/DL
HCT VFR BLD CALC: 43.4 %
HGB BLD-MCNC: 13.2 G/DL
IMM GRANULOCYTES NFR BLD AUTO: 0.3 %
IRON SATN MFR SERPL: 13 %
IRON SERPL-MCNC: 41 UG/DL
LYMPHOCYTES # BLD AUTO: 3.26 K/UL
LYMPHOCYTES NFR BLD AUTO: 30.7 %
MAN DIFF?: NORMAL
MCHC RBC-ENTMCNC: 26 PG
MCHC RBC-ENTMCNC: 30.4 GM/DL
MCV RBC AUTO: 85.4 FL
MONOCYTES # BLD AUTO: 0.79 K/UL
MONOCYTES NFR BLD AUTO: 7.4 %
NEUTROPHILS # BLD AUTO: 6.05 K/UL
NEUTROPHILS NFR BLD AUTO: 56.9 %
PLATELET # BLD AUTO: 309 K/UL
POTASSIUM SERPL-SCNC: 4.3 MMOL/L
PROMETHEUS ANSER ADA: NORMAL
PROMETHEUS LABORATORY FOOTER: NORMAL
PROT SERPL-MCNC: 7.4 G/DL
RBC # BLD: 5.08 M/UL
RBC # FLD: 14.4 %
SERUM ADALIMUMAB (ADA) CONCENTRATION: < 1.6 UG/ML
SODIUM SERPL-SCNC: 140 MMOL/L
TIBC SERPL-MCNC: 310 UG/DL
TSH SERPL-ACNC: <0.01 UIU/ML
UIBC SERPL-MCNC: 269 UG/DL
VIT B12 SERPL-MCNC: 726 PG/ML
WBC # FLD AUTO: 10.63 K/UL

## 2019-05-01 ENCOUNTER — APPOINTMENT (OUTPATIENT)
Dept: GASTROENTEROLOGY | Facility: CLINIC | Age: 59
End: 2019-05-01
Payer: COMMERCIAL

## 2019-05-01 VITALS
HEIGHT: 65.5 IN | HEART RATE: 101 BPM | OXYGEN SATURATION: 97 % | TEMPERATURE: 98.4 F | DIASTOLIC BLOOD PRESSURE: 86 MMHG | SYSTOLIC BLOOD PRESSURE: 140 MMHG

## 2019-05-01 DIAGNOSIS — R15.2 FECAL URGENCY: ICD-10-CM

## 2019-05-01 PROCEDURE — 99214 OFFICE O/P EST MOD 30 MIN: CPT | Mod: 25

## 2019-05-01 PROCEDURE — 36415 COLL VENOUS BLD VENIPUNCTURE: CPT

## 2019-05-01 NOTE — PHYSICAL EXAM
[General Appearance - Alert] : alert [Neck Appearance] : the appearance of the neck was normal [General Appearance - In No Acute Distress] : in no acute distress [Neck Cervical Mass (___cm)] : no neck mass was observed [Thyroid Diffuse Enlargement] : the thyroid was not enlarged [Jugular Venous Distention Increased] : there was no jugular-venous distention [Auscultation Breath Sounds / Voice Sounds] : lungs were clear to auscultation bilaterally [Thyroid Nodule] : there were no palpable thyroid nodules [Heart Rate And Rhythm] : heart rate was normal and rhythm regular [Heart Sounds] : normal S1 and S2 [Heart Sounds Pericardial Friction Rub] : no pericardial rub [Murmurs] : no murmurs [Heart Sounds Gallop] : no gallops [Edema] : there was no peripheral edema [Bowel Sounds] : normal bowel sounds [Abdomen Mass (___ Cm)] : no abdominal mass palpated [] : no hepato-splenomegaly [Abdomen Soft] : soft [No CVA Tenderness] : no ~M costovertebral angle tenderness [No Spinal Tenderness] : no spinal tenderness [Oriented To Time, Place, And Person] : oriented to person, place, and time [Impaired Insight] : insight and judgment were intact [Affect] : the affect was normal [FreeTextEntry1] : obese, mild LLQ tenderness

## 2019-05-01 NOTE — ASSESSMENT
[FreeTextEntry1] : Patient with Crohn's colitis who has developed antibodies to Humira with undetectable levels. Hand in hand with this, the patient's inflammatory markers are rising. She is doing relatively well clinically although she has fecal urgency.\par \par I had a long discussion with the patient regarding other options. She definitely needs to switch to a different biologic. We agreed to pursue Entyvio infusions. I discussed potential risks.\par \par We will begin the insurance verification process and get the patient connected with an infusion center.\par \par Blood work was sent for quantiferrin gold and hepatitis B and C. serologies.\par \par The patient has started vitamin D 3 2000 international units q.d.\par \par Patient has an appointment with her endocrinologist regarding the very low TSH.\par \par Patient is due for colonoscopy in July.\par \par \par Plan from 4/10/19 - Patient with Crohn's colitis on Humira.\par \par We will draw labs at trough levels of Humira in one and a half weeks. These will include Humira levels and antibodies, CBC, Chem-matthias, TSH, ESR, C-reactive protein, iron studies, B12, folate, vitamin D.\par \par I have renewed hydrocortisone cream for her hemorrhoids.\par \par Patient will return to see me in July. She is due for a colonoscopy at that time.\par \par \par Note from 11/19/18 - Patient with Crohn's colitis who has begun Humira. She is currently doing well.\par \par Patient will continue her current medications.\par \par Blood work was sent for CBC, chem pack, sedimentation rate, C-reactive protein, iron studies, B12, folate.\par \par Patient will return to see me in 3 months.\par \par \par Plan from 10/19/18 - Patient with Crohn's colitis with active inflammation on previous colonoscopy and elevated inflammatory markers on recent blood work. She has had a worsening of her symptoms. At her last visit, we had discussed the possibility of beginning a biologic treatment but she decided to hold off at that time. The patient reports that her gynecologist had concerns regarding impact of the Crohn's disease on gynecologic organs although the patient has no symptoms referable to the urinary tract or any symptoms of fecal output vaginally. The patient has left lower quadrant tenderness on exam.\par \par A long discussion with the patient regarding biologic use. We agreed to begin Humira after discussion regarding the possible risks. We have begun the process of establishing the patient with a specialty pharmacy and beginning insurance verification.\par \par Patient was sent for a CT scan of the abdomen and pelvis to rule out any fistula or abscess.\par \par \par Plan from 8/31/18 - Patient with evidence of Crohn's colitis on colonoscopy with the presence of granulomas on multiple biopsies. The patient is doing well clinically on Apriso bypass active inflammation throughout the colon and elevated inflammatory markers on blood work.\par \par I had a long detailed discussion with the patient regarding our options at this point. We could choose to continue Apriso and altered treatment only if her symptoms change or she showed evidence of a flareup. Alternatively, we can consider a biologic as the patient does have active inflammation.\par \par Blood work was sent for CBC, chem PAC, sedimentation rate, C-reactive protein, B12, folate, iron studies, hepatitis B. and C. serologies, quantiferrin gold.\par \par At the present time, the patient will continue Apriso.\par \par We will repeat a colonoscopy in one year that is July 2019.\par \par Patient will return to see me in 2 months.\par \par \par Plan from 6/18/18 - Patient with ulcerative colitis doing well clinically.\par \par A colonoscopy has been scheduled. The risks, benefits, alternatives, and limitations of the procedure, including the possibility of missed lesions, were explained.\par \par Blood work was sent for CBC, chem pack, sedimentation rate, C-reactive protein, iron studies, B12, folate.\par \par \par Plan from 2/2/18 - Patient doing better after a flareup of her ulcerative colitis. She did not require prednisone. She is now doing well on Apriso.\par \par Patient will continue Apriso 4 tablets a day.\par \par Blood work was sent for CBC, chem pack, sedimentation rate, C-reactive protein, iron studies, B12, folate.\par \par Patient will return to see me in 3 months. She is due for colonoscopy in June.\par \par \par Plan from 1/2/18 - Patient with a flareup of ulcerative colitis. She is starting to improve on Apriso. It is possible that the patient's hyperthyroidism is partially contributing to the diarrhea symptoms.\par \par Patient will continue Apriso for tablets a day.\par \par At this point, the patient was advised to stay off of prednisone.\par \par Patient was given hydrocortisone cream 2.5% to apply to hemorrhoids.\par \par Patient was advised to proceed with radioactive iodine to treat the hyperthyroidism.\par \par Patient will return to see me in one month. We will plan on a colonoscopy in June.\par \par \par Plan from 11/28/17 - Patient with probable ulcerative colitis who has symptoms of diarrhea with bleeding despite being on Lialda.  \par \par Stool studies will be sent for PCR testing, C. diff, lactoferrin, and calprotectin.\par \par Blood work was sent for CBC, sedimentation rate, C-reactive protein, TFTs.\par \par If the above are consistent with ulcerative colitis, the patient will require a short course of steroids.\par \par \par Plan from 8/28/17 - Patient with a pancolitis on colonoscopy. We do not have a clear understanding of whether or not she has true inflammatory bowel disease. Her markers are negative.\par \par Patient will continue on Lialda 4.8 g a day.\par \par Patient will return to see me in one month.\par \par Colonoscopy in June 2018.\par \par \par Plan from 7/5/17 - Patient with pancolitis on colonoscopy. The biopsy findings along with the positive calprotectin and lactoferrin suggests the possibility of IBD. The patient had mild elevation of her LFTs but these have normalized.\par \par Blood work was sent for IBD serology, CBC, sedimentation rate, C-reactive protein.\par \par Patient was started on Lialda 4.8 g a day.\par \par We will repeat a colonoscopy in one year that is June 2018.\par \par Patient has been following with her endocrinologist regarding her hyperthyroidism.\par \par Patient will return to see me in one month.
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
## 2019-05-01 NOTE — CONSULT LETTER
[FreeTextEntry1] : Dear Dr. Shelton Leavitt ,\par \par I had the pleasure of seeing your patient MARY ALICE REMY in the office today.  My office note is attached.\par \par Thank you very much for allowing me to participate in the care of your patient.\par \par Sincerely,\par \par Vince Lizarraga M.D., FACG, FACP\par Director, Celiac Program at [facility]\par  of Medicine\par [employer] at [facility] Practice Director,\par [facility] - Gastroenterology/Internal Medicine at [place]\par [address]\[place] [address]\par Tel: [phone]\par Email: [email or web address]

## 2019-05-04 ENCOUNTER — RX RENEWAL (OUTPATIENT)
Age: 59
End: 2019-05-04

## 2019-05-06 LAB
HBV CORE IGG+IGM SER QL: NONREACTIVE
HBV SURFACE AB SER QL: NONREACTIVE
HBV SURFACE AG SER QL: NONREACTIVE
HCV AB SER QL: NONREACTIVE
HCV S/CO RATIO: 0.11 S/CO
M TB IFN-G BLD-IMP: NEGATIVE
QUANTIFERON TB PLUS MITOGEN MINUS NIL: >10 IU/ML
QUANTIFERON TB PLUS NIL: 0.01 IU/ML
QUANTIFERON TB PLUS TB1 MINUS NIL: 0.01 IU/ML
QUANTIFERON TB PLUS TB2 MINUS NIL: 0.01 IU/ML

## 2019-06-15 ENCOUNTER — MESSAGE (OUTPATIENT)
Age: 59
End: 2019-06-15

## 2019-06-17 ENCOUNTER — MESSAGE (OUTPATIENT)
Age: 59
End: 2019-06-17

## 2019-06-18 ENCOUNTER — RX RENEWAL (OUTPATIENT)
Age: 59
End: 2019-06-18

## 2019-11-13 ENCOUNTER — RX RENEWAL (OUTPATIENT)
Age: 59
End: 2019-11-13

## 2020-01-02 ENCOUNTER — LABORATORY RESULT (OUTPATIENT)
Age: 60
End: 2020-01-02

## 2020-01-02 ENCOUNTER — APPOINTMENT (OUTPATIENT)
Dept: GASTROENTEROLOGY | Facility: CLINIC | Age: 60
End: 2020-01-02
Payer: COMMERCIAL

## 2020-01-02 VITALS
HEART RATE: 107 BPM | BODY MASS INDEX: 40.82 KG/M2 | OXYGEN SATURATION: 95 % | DIASTOLIC BLOOD PRESSURE: 70 MMHG | HEIGHT: 65.5 IN | SYSTOLIC BLOOD PRESSURE: 128 MMHG | WEIGHT: 248 LBS | TEMPERATURE: 98.2 F

## 2020-01-02 PROCEDURE — 36415 COLL VENOUS BLD VENIPUNCTURE: CPT

## 2020-01-02 PROCEDURE — 99214 OFFICE O/P EST MOD 30 MIN: CPT | Mod: 25

## 2020-01-02 RX ORDER — LINAGLIPTIN 5 MG/1
5 TABLET, FILM COATED ORAL
Refills: 0 | Status: DISCONTINUED | COMMUNITY
End: 2020-01-02

## 2020-01-02 RX ORDER — ADALIMUMAB 40MG/0.4ML
40 KIT SUBCUTANEOUS
Qty: 3 | Refills: 1 | Status: DISCONTINUED | COMMUNITY
Start: 2018-10-19 | End: 2020-01-02

## 2020-01-05 LAB
25(OH)D3 SERPL-MCNC: 25.7 NG/ML
ALBUMIN SERPL ELPH-MCNC: 4.1 G/DL
ALP BLD-CCNC: 119 U/L
ALT SERPL-CCNC: 29 U/L
ANION GAP SERPL CALC-SCNC: 18 MMOL/L
AST SERPL-CCNC: 28 U/L
BASOPHILS # BLD AUTO: 0.06 K/UL
BASOPHILS NFR BLD AUTO: 0.7 %
BILIRUB SERPL-MCNC: 0.2 MG/DL
BUN SERPL-MCNC: 9 MG/DL
CALCIUM SERPL-MCNC: 9.8 MG/DL
CHLORIDE SERPL-SCNC: 96 MMOL/L
CO2 SERPL-SCNC: 22 MMOL/L
CREAT SERPL-MCNC: 0.53 MG/DL
CRP SERPL-MCNC: 3.03 MG/DL
EOSINOPHIL # BLD AUTO: 0.25 K/UL
EOSINOPHIL NFR BLD AUTO: 2.7 %
ERYTHROCYTE [SEDIMENTATION RATE] IN BLOOD BY WESTERGREN METHOD: 91 MM/HR
FERRITIN SERPL-MCNC: 144 NG/ML
FOLATE SERPL-MCNC: >20 NG/ML
GLUCOSE SERPL-MCNC: 434 MG/DL
HCT VFR BLD CALC: 45.2 %
HGB BLD-MCNC: 14.4 G/DL
IMM GRANULOCYTES NFR BLD AUTO: 0.2 %
IRON SATN MFR SERPL: 15 %
IRON SERPL-MCNC: 46 UG/DL
LYMPHOCYTES # BLD AUTO: 2.32 K/UL
LYMPHOCYTES NFR BLD AUTO: 25.3 %
MAN DIFF?: NORMAL
MCHC RBC-ENTMCNC: 26.9 PG
MCHC RBC-ENTMCNC: 31.9 GM/DL
MCV RBC AUTO: 84.3 FL
MONOCYTES # BLD AUTO: 0.56 K/UL
MONOCYTES NFR BLD AUTO: 6.1 %
NEUTROPHILS # BLD AUTO: 5.96 K/UL
NEUTROPHILS NFR BLD AUTO: 65 %
PLATELET # BLD AUTO: 300 K/UL
POTASSIUM SERPL-SCNC: 4.5 MMOL/L
PROT SERPL-MCNC: 7.4 G/DL
RBC # BLD: 5.36 M/UL
RBC # FLD: 13.7 %
SODIUM SERPL-SCNC: 136 MMOL/L
TIBC SERPL-MCNC: 308 UG/DL
TSH SERPL-ACNC: 0.01 UIU/ML
UIBC SERPL-MCNC: 262 UG/DL
VIT B12 SERPL-MCNC: 895 PG/ML
WBC # FLD AUTO: 9.17 K/UL
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
## 2020-01-05 NOTE — CONSULT LETTER
[FreeTextEntry1] : Dear Dr. Shelton Leavitt ,\par \par I had the pleasure of seeing your patient MARY ALICE REMY in the office today.  My office note is attached.\par \par Thank you very much for allowing me to participate in the care of your patient.\par \par Sincerely,\par \par Vince Lizarraga M.D., FAC, FACP\par Director, Celiac Program at [facility]\par  of Medicine\par [employer] at [facility] Practice Director,\par [facility] - Gastroenterology/Internal Medicine at [place]\[facility] [address]\[place] [address]\par Tel: [phone]\par Email: [email or web address] \par \par \par The attached note has been created using a voice recognition system (Dragon).  There may be some misspellings and typos.  Please call my office if you have any issues or questions.

## 2020-01-05 NOTE — REVIEW OF SYSTEMS
[As Noted in HPI] : as noted in HPI [Negative] : Endocrine [FreeTextEntry4] : current sinus infection [FreeTextEntry9] : lower back pain

## 2020-01-05 NOTE — ASSESSMENT
[FreeTextEntry1] : Patient with Crohn's colitis who is now on Entyvio infusions.  She is doing well clinically.\par \par Bloodwork was sent for CBC, Chem-matthias, TSH, ESR, C-reactive protein, iron studies, B12, folate, vitamin D.\par \par Patient will go for Entyvio levels and antibody blood testing prior to her next infusion in February.\par \par A colonoscopy has been scheduled. The risks, benefits, alternatives, and limitations of the procedure, including the possibility of missed lesions, were explained.  The patient will require anesthesia evaluation given her BMI of 40.64.\par \par \par Plan from 5/1/2019 - Patient with Crohn's colitis who has developed antibodies to Humira with undetectable levels. Hand in hand with this, the patient's inflammatory markers are rising. She is doing relatively well clinically although she has fecal urgency.\par \par I had a long discussion with the patient regarding other options. She definitely needs to switch to a different biologic. We agreed to pursue Entyvio infusions. I discussed potential risks.\par \par We will begin the insurance verification process and get the patient connected with an infusion center.\par \par Blood work was sent for quantiferrin gold and hepatitis B and C. serologies.\par \par The patient has started vitamin D 3 2000 international units q.d.\par \par Patient has an appointment with her endocrinologist regarding the very low TSH.\par \par Patient is due for colonoscopy in July.\par \par \par Plan from 4/10/19 - Patient with Crohn's colitis on Humira.\par \par We will draw labs at trough levels of Humira in one and a half weeks. These will include Humira levels and antibodies, CBC, Chem-matthias, TSH, ESR, C-reactive protein, iron studies, B12, folate, vitamin D.\par \par I have renewed hydrocortisone cream for her hemorrhoids.\par \par Patient will return to see me in July. She is due for a colonoscopy at that time.\par \par \par Note from 11/19/18 - Patient with Crohn's colitis who has begun Humira. She is currently doing well.\par \par Patient will continue her current medications.\par \par Blood work was sent for CBC, chem pack, sedimentation rate, C-reactive protein, iron studies, B12, folate.\par \par Patient will return to see me in 3 months.\par \par \par Plan from 10/19/18 - Patient with Crohn's colitis with active inflammation on previous colonoscopy and elevated inflammatory markers on recent blood work. She has had a worsening of her symptoms. At her last visit, we had discussed the possibility of beginning a biologic treatment but she decided to hold off at that time. The patient reports that her gynecologist had concerns regarding impact of the Crohn's disease on gynecologic organs although the patient has no symptoms referable to the urinary tract or any symptoms of fecal output vaginally. The patient has left lower quadrant tenderness on exam.\par \par A long discussion with the patient regarding biologic use. We agreed to begin Humira after discussion regarding the possible risks. We have begun the process of establishing the patient with a specialty pharmacy and beginning insurance verification.\par \par Patient was sent for a CT scan of the abdomen and pelvis to rule out any fistula or abscess.\par \par \par Plan from 8/31/18 - Patient with evidence of Crohn's colitis on colonoscopy with the presence of granulomas on multiple biopsies. The patient is doing well clinically on Apriso bypass active inflammation throughout the colon and elevated inflammatory markers on blood work.\par \par I had a long detailed discussion with the patient regarding our options at this point. We could choose to continue Apriso and altered treatment only if her symptoms change or she showed evidence of a flareup. Alternatively, we can consider a biologic as the patient does have active inflammation.\par \par Blood work was sent for CBC, chem PAC, sedimentation rate, C-reactive protein, B12, folate, iron studies, hepatitis B. and C. serologies, quantiferrin gold.\par \par At the present time, the patient will continue Apriso.\par \par We will repeat a colonoscopy in one year that is July 2019.\par \par Patient will return to see me in 2 months.\par \par \par Plan from 6/18/18 - Patient with ulcerative colitis doing well clinically.\par \par A colonoscopy has been scheduled. The risks, benefits, alternatives, and limitations of the procedure, including the possibility of missed lesions, were explained.\par \par Blood work was sent for CBC, chem pack, sedimentation rate, C-reactive protein, iron studies, B12, folate.\par \par \par Plan from 2/2/18 - Patient doing better after a flareup of her ulcerative colitis. She did not require prednisone. She is now doing well on Apriso.\par \par Patient will continue Apriso 4 tablets a day.\par \par Blood work was sent for CBC, chem pack, sedimentation rate, C-reactive protein, iron studies, B12, folate.\par \par Patient will return to see me in 3 months. She is due for colonoscopy in June.\par \par \par Plan from 1/2/18 - Patient with a flareup of ulcerative colitis. She is starting to improve on Apriso. It is possible that the patient's hyperthyroidism is partially contributing to the diarrhea symptoms.\par \par Patient will continue Apriso for tablets a day.\par \par At this point, the patient was advised to stay off of prednisone.\par \par Patient was given hydrocortisone cream 2.5% to apply to hemorrhoids.\par \par Patient was advised to proceed with radioactive iodine to treat the hyperthyroidism.\par \par Patient will return to see me in one month. We will plan on a colonoscopy in June.\par \par \par Plan from 11/28/17 - Patient with probable ulcerative colitis who has symptoms of diarrhea with bleeding despite being on Lialda.  \par \par Stool studies will be sent for PCR testing, C. diff, lactoferrin, and calprotectin.\par \par Blood work was sent for CBC, sedimentation rate, C-reactive protein, TFTs.\par \par If the above are consistent with ulcerative colitis, the patient will require a short course of steroids.\par \par \par Plan from 8/28/17 - Patient with a pancolitis on colonoscopy. We do not have a clear understanding of whether or not she has true inflammatory bowel disease. Her markers are negative.\par \par Patient will continue on Lialda 4.8 g a day.\par \par Patient will return to see me in one month.\par \par Colonoscopy in June 2018.\par \par \par Plan from 7/5/17 - Patient with pancolitis on colonoscopy. The biopsy findings along with the positive calprotectin and lactoferrin suggests the possibility of IBD. The patient had mild elevation of her LFTs but these have normalized.\par \par Blood work was sent for IBD serology, CBC, sedimentation rate, C-reactive protein.\par \par Patient was started on Lialda 4.8 g a day.\par \par We will repeat a colonoscopy in one year that is June 2018.\par \par Patient has been following with her endocrinologist regarding her hyperthyroidism.\par \par Patient will return to see me in one month.

## 2020-01-05 NOTE — PHYSICAL EXAM
[General Appearance - Alert] : alert [Neck Cervical Mass (___cm)] : no neck mass was observed [General Appearance - In No Acute Distress] : in no acute distress [Neck Appearance] : the appearance of the neck was normal [Jugular Venous Distention Increased] : there was no jugular-venous distention [Thyroid Nodule] : there were no palpable thyroid nodules [Thyroid Diffuse Enlargement] : the thyroid was not enlarged [Heart Rate And Rhythm] : heart rate was normal and rhythm regular [Heart Sounds] : normal S1 and S2 [Auscultation Breath Sounds / Voice Sounds] : lungs were clear to auscultation bilaterally [Heart Sounds Pericardial Friction Rub] : no pericardial rub [Murmurs] : no murmurs [Heart Sounds Gallop] : no gallops [Edema] : there was no peripheral edema [Bowel Sounds] : normal bowel sounds [Abdomen Mass (___ Cm)] : no abdominal mass palpated [Abdomen Soft] : soft [] : no hepato-splenomegaly [No CVA Tenderness] : no ~M costovertebral angle tenderness [No Spinal Tenderness] : no spinal tenderness [Oriented To Time, Place, And Person] : oriented to person, place, and time [Impaired Insight] : insight and judgment were intact [Affect] : the affect was normal [FreeTextEntry1] : obese, mild LLQ tenderness

## 2020-01-09 LAB
ANTIBODIES TO VEDOLIZUMAB (ATV) CONCENTRATION: < 1.6 U/ML
PROMETHEUS ANSER VDZ: NORMAL
PROMETHEUS LABORATORY FOOTER: NORMAL
SERUM VEDOLIZUMAB (VDZ) CONCENTRATION: 20.1 UG/ML

## 2020-01-22 ENCOUNTER — APPOINTMENT (OUTPATIENT)
Dept: GASTROENTEROLOGY | Facility: AMBULATORY MEDICAL SERVICES | Age: 60
End: 2020-01-22

## 2020-02-07 ENCOUNTER — APPOINTMENT (OUTPATIENT)
Dept: GASTROENTEROLOGY | Facility: AMBULATORY MEDICAL SERVICES | Age: 60
End: 2020-02-07

## 2020-02-09 LAB
ANTIBODIES TO VEDOLIZUMAB (ATV) CONCENTRATION: < 1.6 U/ML
PROMETHEUS ANSER VDZ: NORMAL
PROMETHEUS LABORATORY FOOTER: NORMAL
SERUM VEDOLIZUMAB (VDZ) CONCENTRATION: 5.4 UG/ML

## 2020-02-13 ENCOUNTER — APPOINTMENT (OUTPATIENT)
Dept: GASTROENTEROLOGY | Facility: AMBULATORY MEDICAL SERVICES | Age: 60
End: 2020-02-13
Payer: COMMERCIAL

## 2020-02-13 PROCEDURE — 45380 COLONOSCOPY AND BIOPSY: CPT

## 2020-02-27 ENCOUNTER — APPOINTMENT (OUTPATIENT)
Dept: GASTROENTEROLOGY | Facility: CLINIC | Age: 60
End: 2020-02-27
Payer: COMMERCIAL

## 2020-02-27 VITALS
DIASTOLIC BLOOD PRESSURE: 60 MMHG | HEART RATE: 90 BPM | SYSTOLIC BLOOD PRESSURE: 128 MMHG | HEIGHT: 65.5 IN | BODY MASS INDEX: 40.49 KG/M2 | OXYGEN SATURATION: 98 % | TEMPERATURE: 98.2 F | WEIGHT: 246 LBS

## 2020-02-27 DIAGNOSIS — K51.00 ULCERATIVE (CHRONIC) PANCOLITIS W/OUT COMPLICATIONS: ICD-10-CM

## 2020-02-27 PROCEDURE — 99214 OFFICE O/P EST MOD 30 MIN: CPT

## 2020-02-27 RX ORDER — SODIUM SULFATE, POTASSIUM SULFATE, MAGNESIUM SULFATE 17.5; 3.13; 1.6 G/ML; G/ML; G/ML
17.5-3.13-1.6 SOLUTION, CONCENTRATE ORAL
Qty: 1 | Refills: 0 | Status: DISCONTINUED | COMMUNITY
Start: 2020-01-02 | End: 2020-02-27

## 2020-02-28 PROBLEM — K51.00 ULCERATIVE PANCOLITIS WITHOUT COMPLICATION: Noted: 2017-07-05
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
## 2020-02-28 NOTE — CONSULT LETTER
[FreeTextEntry1] : Dear Dr. Shelton Leavitt ,\[facility] \[facility] I had the pleasure of seeing your patient MARY ALICE REMY in the office today.  My office note is attached. PLEASE READ THE "ASSESSMENT" SECTION OF THE NOTE TO SEE MY IMPRESSION AND PLAN.\par \[facility] Thank you very much for allowing me to participate in the care of your patient.\[facility] \[facility] Sincerely,\[facility] \[facility] Vince Lizarraga M.D., FAC, [facility] Director, Celiac Program at [facility]  of Medicine\[facility] and [employer] at [facility] Practice Director,[facility] - Gastroenterology/Internal Medicine at [place]\[facility] [address]\[place], [place] [address] Tel: [phone]\[facility] Email: [email or web address] \[facility] \[facility] The attached note has been created using a voice recognition system (Dragon).  There may be some misspellings and typos.  Please call my office if you have any issues or questions.

## 2020-02-28 NOTE — PHYSICAL EXAM
[General Appearance - Alert] : alert [Neck Cervical Mass (___cm)] : no neck mass was observed [General Appearance - In No Acute Distress] : in no acute distress [Jugular Venous Distention Increased] : there was no jugular-venous distention [Neck Appearance] : the appearance of the neck was normal [Thyroid Diffuse Enlargement] : the thyroid was not enlarged [Thyroid Nodule] : there were no palpable thyroid nodules [Auscultation Breath Sounds / Voice Sounds] : lungs were clear to auscultation bilaterally [Heart Rate And Rhythm] : heart rate was normal and rhythm regular [Heart Sounds] : normal S1 and S2 [Heart Sounds Gallop] : no gallops [Murmurs] : no murmurs [Bowel Sounds] : normal bowel sounds [Heart Sounds Pericardial Friction Rub] : no pericardial rub [Edema] : there was no peripheral edema [] : no hepato-splenomegaly [Abdomen Mass (___ Cm)] : no abdominal mass palpated [Abdomen Soft] : soft [No Spinal Tenderness] : no spinal tenderness [No CVA Tenderness] : no ~M costovertebral angle tenderness [Affect] : the affect was normal [Oriented To Time, Place, And Person] : oriented to person, place, and time [Impaired Insight] : insight and judgment were intact [FreeTextEntry1] : obese, mild LLQ tenderness

## 2020-02-28 NOTE — REVIEW OF SYSTEMS
[As Noted in HPI] : as noted in HPI [Negative] : Heme/Lymph [FreeTextEntry9] : lower back pain [FreeTextEntry4] : current sinus infection

## 2020-02-28 NOTE — ASSESSMENT
[FreeTextEntry1] : Patient with Crohn's disease who now has well-controlled disease on colonoscopy other than active disease in the rectum.  This is consistent with her symptoms of rectal spasm.  Her Entyvio levels are a bit low with negative antibody formation. \par \par I have added Canasa suppository 1000 mg nightly.  I also advised the patient to use Citrucel daily to try to make her bowel movements more regular.\par \par We will keep the Entyvio infusions at the current frequency.  If the patient's symptoms worsen, we can consider increasing the frequency of infusions based on Entyvio levels.\par \par Patient will continue to follow-up with her endocrinologist for her diabetes and hypothyroidism.\par \par We will repeat a colonoscopy in 1 year that is February 2021.\par \par \par Plan from 1/2/2020  - Patient with Crohn's colitis who is now on Entyvio infusions.  She is doing well clinically.\par \par Bloodwork was sent for CBC, Chem-matthias, TSH, ESR, C-reactive protein, iron studies, B12, folate, vitamin D.\par \par Patient will go for Entyvio levels and antibody blood testing prior to her next infusion in February.\par \par A colonoscopy has been scheduled. The risks, benefits, alternatives, and limitations of the procedure, including the possibility of missed lesions, were explained.  The patient will require anesthesia evaluation given her BMI of 40.64.\par \par \par Plan from 5/1/2019 - Patient with Crohn's colitis who has developed antibodies to Humira with undetectable levels. Hand in hand with this, the patient's inflammatory markers are rising. She is doing relatively well clinically although she has fecal urgency.\par \par I had a long discussion with the patient regarding other options. She definitely needs to switch to a different biologic. We agreed to pursue Entyvio infusions. I discussed potential risks.\par \par We will begin the insurance verification process and get the patient connected with an infusion center.\par \par Blood work was sent for quantiferrin gold and hepatitis B and C. serologies.\par \par The patient has started vitamin D 3 2000 international units q.d.\par \par Patient has an appointment with her endocrinologist regarding the very low TSH.\par \par Patient is due for colonoscopy in July.\par \par \par Plan from 4/10/19 - Patient with Crohn's colitis on Humira.\par \par We will draw labs at trough levels of Humira in one and a half weeks. These will include Humira levels and antibodies, CBC, Chem-matthias, TSH, ESR, C-reactive protein, iron studies, B12, folate, vitamin D.\par \par I have renewed hydrocortisone cream for her hemorrhoids.\par \par Patient will return to see me in July. She is due for a colonoscopy at that time.\par \par \par Note from 11/19/18 - Patient with Crohn's colitis who has begun Humira. She is currently doing well.\par \par Patient will continue her current medications.\par \par Blood work was sent for CBC, chem pack, sedimentation rate, C-reactive protein, iron studies, B12, folate.\par \par Patient will return to see me in 3 months.\par \par \par Plan from 10/19/18 - Patient with Crohn's colitis with active inflammation on previous colonoscopy and elevated inflammatory markers on recent blood work. She has had a worsening of her symptoms. At her last visit, we had discussed the possibility of beginning a biologic treatment but she decided to hold off at that time. The patient reports that her gynecologist had concerns regarding impact of the Crohn's disease on gynecologic organs although the patient has no symptoms referable to the urinary tract or any symptoms of fecal output vaginally. The patient has left lower quadrant tenderness on exam.\par \par A long discussion with the patient regarding biologic use. We agreed to begin Humira after discussion regarding the possible risks. We have begun the process of establishing the patient with a specialty pharmacy and beginning insurance verification.\par \par Patient was sent for a CT scan of the abdomen and pelvis to rule out any fistula or abscess.\par \par \par Plan from 8/31/18 - Patient with evidence of Crohn's colitis on colonoscopy with the presence of granulomas on multiple biopsies. The patient is doing well clinically on Apriso bypass active inflammation throughout the colon and elevated inflammatory markers on blood work.\par \par I had a long detailed discussion with the patient regarding our options at this point. We could choose to continue Apriso and altered treatment only if her symptoms change or she showed evidence of a flareup. Alternatively, we can consider a biologic as the patient does have active inflammation.\par \par Blood work was sent for CBC, chem PAC, sedimentation rate, C-reactive protein, B12, folate, iron studies, hepatitis B. and C. serologies, quantiferrin gold.\par \par At the present time, the patient will continue Apriso.\par \par We will repeat a colonoscopy in one year that is July 2019.\par \par Patient will return to see me in 2 months.\par \par \par Plan from 6/18/18 - Patient with ulcerative colitis doing well clinically.\par \par A colonoscopy has been scheduled. The risks, benefits, alternatives, and limitations of the procedure, including the possibility of missed lesions, were explained.\par \par Blood work was sent for CBC, chem pack, sedimentation rate, C-reactive protein, iron studies, B12, folate.\par \par \par Plan from 2/2/18 - Patient doing better after a flareup of her ulcerative colitis. She did not require prednisone. She is now doing well on Apriso.\par \par Patient will continue Apriso 4 tablets a day.\par \par Blood work was sent for CBC, chem pack, sedimentation rate, C-reactive protein, iron studies, B12, folate.\par \par Patient will return to see me in 3 months. She is due for colonoscopy in June.\par \par \par Plan from 1/2/18 - Patient with a flareup of ulcerative colitis. She is starting to improve on Apriso. It is possible that the patient's hyperthyroidism is partially contributing to the diarrhea symptoms.\par \par Patient will continue Apriso for tablets a day.\par \par At this point, the patient was advised to stay off of prednisone.\par \par Patient was given hydrocortisone cream 2.5% to apply to hemorrhoids.\par \par Patient was advised to proceed with radioactive iodine to treat the hyperthyroidism.\par \par Patient will return to see me in one month. We will plan on a colonoscopy in June.\par \par \par Plan from 11/28/17 - Patient with probable ulcerative colitis who has symptoms of diarrhea with bleeding despite being on Lialda.  \par \par Stool studies will be sent for PCR testing, C. diff, lactoferrin, and calprotectin.\par \par Blood work was sent for CBC, sedimentation rate, C-reactive protein, TFTs.\par \par If the above are consistent with ulcerative colitis, the patient will require a short course of steroids.\par \par \par Plan from 8/28/17 - Patient with a pancolitis on colonoscopy. We do not have a clear understanding of whether or not she has true inflammatory bowel disease. Her markers are negative.\par \par Patient will continue on Lialda 4.8 g a day.\par \par Patient will return to see me in one month.\par \par Colonoscopy in June 2018.\par \par \par Plan from 7/5/17 - Patient with pancolitis on colonoscopy. The biopsy findings along with the positive calprotectin and lactoferrin suggests the possibility of IBD. The patient had mild elevation of her LFTs but these have normalized.\par \par Blood work was sent for IBD serology, CBC, sedimentation rate, C-reactive protein.\par \par Patient was started on Lialda 4.8 g a day.\par \par We will repeat a colonoscopy in one year that is June 2018.\par \par Patient has been following with her endocrinologist regarding her hyperthyroidism.\par \par Patient will return to see me in one month.

## 2020-05-15 ENCOUNTER — APPOINTMENT (OUTPATIENT)
Dept: GASTROENTEROLOGY | Facility: CLINIC | Age: 60
End: 2020-05-15
Payer: COMMERCIAL

## 2020-05-15 ENCOUNTER — APPOINTMENT (OUTPATIENT)
Dept: GASTROENTEROLOGY | Facility: CLINIC | Age: 60
End: 2020-05-15

## 2020-05-15 PROCEDURE — 99214 OFFICE O/P EST MOD 30 MIN: CPT | Mod: 95

## 2020-05-15 NOTE — PHYSICAL EXAM
[General Appearance - Alert] : alert [General Appearance - In No Acute Distress] : in no acute distress [Oriented To Time, Place, And Person] : oriented to person, place, and time [Affect] : the affect was normal [Impaired Insight] : insight and judgment were intact

## 2020-05-17 NOTE — CONSULT LETTER
[FreeTextEntry1] : Dear Dr. Shelton Leavitt ,\[facility] \[facility] I had the pleasure of seeing your patient MARY ALICE REMY in the office today.  My office note is attached. PLEASE READ THE "ASSESSMENT" SECTION OF THE NOTE TO SEE MY IMPRESSION AND PLAN.\par \[facility] Thank you very much for allowing me to participate in the care of your patient.\[facility] \[facility] Sincerely,\[facility] \[facility] Vince Lizarraga M.D., FAC, [facility] Director, Celiac Program at [facility]  of Medicine\[facility] and [employer] at [facility] Practice Director,[facility] - Gastroenterology/Internal Medicine at [place]\[facility] [address]\[place], [place] [address] Tel: [phone]\[facility] Email: [email or web address] \[facility] \[facility] The attached note has been created using a voice recognition system (Dragon).  There may be some misspellings and typos.  Please call my office if you have any issues or questions.
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
## 2020-05-17 NOTE — ASSESSMENT
[FreeTextEntry1] : Patient with Crohn's disease who is doing well on a combination of Entyvio and mesalamine suppositories.  She had active disease in her rectum which appears to be better controlled with the mesalamine.\par \par Patient will continue Entyvio and mesalamine suppositories.\par \par I counseled the patient regarding specific concerns of COVID-19 as the patient is on Entyvio.  She was advised of the importance of strictly following guidelines and limiting exposure as she is at increased risk for acquiring the infection and at increased risk for having a more complicated course.\par \par Bloodwork will be sent for CBC, Chem-matthias, TSH, ESR, C-reactive protein, iron studies, B12, folate, vitamin D.  This will be performed at the time of her next Entyvio infusion on June 4.\par \par Patient is due for colonoscopy in February 2021.\par \par Patient will return to see me in 3 months.\par \par \par Plan from 2/27/2020 - Patient with Crohn's disease who now has well-controlled disease on colonoscopy other than active disease in the rectum.  This is consistent with her symptoms of rectal spasm.  Her Entyvio levels are a bit low with negative antibody formation. \par \par I have added Canasa suppository 1000 mg nightly.  I also advised the patient to use Citrucel daily to try to make her bowel movements more regular.\par \par We will keep the Entyvio infusions at the current frequency.  If the patient's symptoms worsen, we can consider increasing the frequency of infusions based on Entyvio levels.\par \par Patient will continue to follow-up with her endocrinologist for her diabetes and hypothyroidism.\par \par We will repeat a colonoscopy in 1 year that is February 2021.\par \par \par Plan from 1/2/2020  - Patient with Crohn's colitis who is now on Entyvio infusions.  She is doing well clinically.\par \par Bloodwork was sent for CBC, Chem-matthias, TSH, ESR, C-reactive protein, iron studies, B12, folate, vitamin D.\par \par Patient will go for Entyvio levels and antibody blood testing prior to her next infusion in February.\par \par A colonoscopy has been scheduled. The risks, benefits, alternatives, and limitations of the procedure, including the possibility of missed lesions, were explained.  The patient will require anesthesia evaluation given her BMI of 40.64.\par \par \par Plan from 5/1/2019 - Patient with Crohn's colitis who has developed antibodies to Humira with undetectable levels. Hand in hand with this, the patient's inflammatory markers are rising. She is doing relatively well clinically although she has fecal urgency.\par \par I had a long discussion with the patient regarding other options. She definitely needs to switch to a different biologic. We agreed to pursue Entyvio infusions. I discussed potential risks.\par \par We will begin the insurance verification process and get the patient connected with an infusion center.\par \par Blood work was sent for quantiferrin gold and hepatitis B and C. serologies.\par \par The patient has started vitamin D 3 2000 international units q.d.\par \par Patient has an appointment with her endocrinologist regarding the very low TSH.\par \par Patient is due for colonoscopy in July.\par \par \par Plan from 4/10/19 - Patient with Crohn's colitis on Humira.\par \par We will draw labs at trough levels of Humira in one and a half weeks. These will include Humira levels and antibodies, CBC, Chem-matthias, TSH, ESR, C-reactive protein, iron studies, B12, folate, vitamin D.\par \par I have renewed hydrocortisone cream for her hemorrhoids.\par \par Patient will return to see me in July. She is due for a colonoscopy at that time.\par \par \par Note from 11/19/18 - Patient with Crohn's colitis who has begun Humira. She is currently doing well.\par \par Patient will continue her current medications.\par \par Blood work was sent for CBC, chem pack, sedimentation rate, C-reactive protein, iron studies, B12, folate.\par \par Patient will return to see me in 3 months.\par \par \par Plan from 10/19/18 - Patient with Crohn's colitis with active inflammation on previous colonoscopy and elevated inflammatory markers on recent blood work. She has had a worsening of her symptoms. At her last visit, we had discussed the possibility of beginning a biologic treatment but she decided to hold off at that time. The patient reports that her gynecologist had concerns regarding impact of the Crohn's disease on gynecologic organs although the patient has no symptoms referable to the urinary tract or any symptoms of fecal output vaginally. The patient has left lower quadrant tenderness on exam.\par \par A long discussion with the patient regarding biologic use. We agreed to begin Humira after discussion regarding the possible risks. We have begun the process of establishing the patient with a specialty pharmacy and beginning insurance verification.\par \par Patient was sent for a CT scan of the abdomen and pelvis to rule out any fistula or abscess.\par \par \par Plan from 8/31/18 - Patient with evidence of Crohn's colitis on colonoscopy with the presence of granulomas on multiple biopsies. The patient is doing well clinically on Apriso bypass active inflammation throughout the colon and elevated inflammatory markers on blood work.\par \par I had a long detailed discussion with the patient regarding our options at this point. We could choose to continue Apriso and altered treatment only if her symptoms change or she showed evidence of a flareup. Alternatively, we can consider a biologic as the patient does have active inflammation.\par \par Blood work was sent for CBC, chem PAC, sedimentation rate, C-reactive protein, B12, folate, iron studies, hepatitis B. and C. serologies, quantiferrin gold.\par \par At the present time, the patient will continue Apriso.\par \par We will repeat a colonoscopy in one year that is July 2019.\par \par Patient will return to see me in 2 months.\par \par \par Plan from 6/18/18 - Patient with ulcerative colitis doing well clinically.\par \par A colonoscopy has been scheduled. The risks, benefits, alternatives, and limitations of the procedure, including the possibility of missed lesions, were explained.\par \par Blood work was sent for CBC, chem pack, sedimentation rate, C-reactive protein, iron studies, B12, folate.\par \par \par Plan from 2/2/18 - Patient doing better after a flareup of her ulcerative colitis. She did not require prednisone. She is now doing well on Apriso.\par \par Patient will continue Apriso 4 tablets a day.\par \par Blood work was sent for CBC, chem pack, sedimentation rate, C-reactive protein, iron studies, B12, folate.\par \par Patient will return to see me in 3 months. She is due for colonoscopy in June.\par \par \par Plan from 1/2/18 - Patient with a flareup of ulcerative colitis. She is starting to improve on Apriso. It is possible that the patient's hyperthyroidism is partially contributing to the diarrhea symptoms.\par \par Patient will continue Apriso for tablets a day.\par \par At this point, the patient was advised to stay off of prednisone.\par \par Patient was given hydrocortisone cream 2.5% to apply to hemorrhoids.\par \par Patient was advised to proceed with radioactive iodine to treat the hyperthyroidism.\par \par Patient will return to see me in one month. We will plan on a colonoscopy in June.\par \par \par Plan from 11/28/17 - Patient with probable ulcerative colitis who has symptoms of diarrhea with bleeding despite being on Lialda.  \par \par Stool studies will be sent for PCR testing, C. diff, lactoferrin, and calprotectin.\par \par Blood work was sent for CBC, sedimentation rate, C-reactive protein, TFTs.\par \par If the above are consistent with ulcerative colitis, the patient will require a short course of steroids.\par \par \par Plan from 8/28/17 - Patient with a pancolitis on colonoscopy. We do not have a clear understanding of whether or not she has true inflammatory bowel disease. Her markers are negative.\par \par Patient will continue on Lialda 4.8 g a day.\par \par Patient will return to see me in one month.\par \par Colonoscopy in June 2018.\par \par \par Plan from 7/5/17 - Patient with pancolitis on colonoscopy. The biopsy findings along with the positive calprotectin and lactoferrin suggests the possibility of IBD. The patient had mild elevation of her LFTs but these have normalized.\par \par Blood work was sent for IBD serology, CBC, sedimentation rate, C-reactive protein.\par \par Patient was started on Lialda 4.8 g a day.\par \par We will repeat a colonoscopy in one year that is June 2018.\par \par Patient has been following with her endocrinologist regarding her hyperthyroidism.\par \par Patient will return to see me in one month.

## 2020-08-19 ENCOUNTER — RX RENEWAL (OUTPATIENT)
Age: 60
End: 2020-08-19

## 2020-09-14 ENCOUNTER — APPOINTMENT (OUTPATIENT)
Dept: GASTROENTEROLOGY | Facility: CLINIC | Age: 60
End: 2020-09-14
Payer: COMMERCIAL

## 2020-09-14 DIAGNOSIS — R10.32 LEFT LOWER QUADRANT PAIN: ICD-10-CM

## 2020-09-14 PROCEDURE — 99214 OFFICE O/P EST MOD 30 MIN: CPT | Mod: 95

## 2020-09-15 PROBLEM — R10.32 LEFT LOWER QUADRANT ABDOMINAL PAIN: Status: ACTIVE | Noted: 2020-09-15

## 2020-09-15 NOTE — ASSESSMENT
[FreeTextEntry1] : Patient with Crohn's disease on Entyvio and mesalamine suppositories.  She was doing well until the last 2 weeks, when she had developed left lower quadrant pain and has had intermittent episodes of tenesmus and mucus with her stools.  She reports tenderness on self-examination.  She has a history of diverticulitis as well.\par \par Patient was sent for a CT scan of the abdomen and pelvis to rule out diverticulitis or Crohn's related complication including abscess.\par \par Bloodwork will be sent for CBC, Chem-matthias, TSH, ESR, C-reactive protein, iron studies, B12, folate, vitamin D, QuantiFERON gold and Entyvio levels on the morning prior to her Entyvio infusion on September 24.\par \par Patient is due for colonoscopy in February 2021.\par \par Patient has low TSH and is being followed for this.\par \par \par Plan from 5/15/2020 - Patient with Crohn's disease who is doing well on a combination of Entyvio and mesalamine suppositories.  She had active disease in her rectum which appears to be better controlled with the mesalamine.\par \par Patient will continue Entyvio and mesalamine suppositories.\par \par I counseled the patient regarding specific concerns of COVID-19 as the patient is on Entyvio.  She was advised of the importance of strictly following guidelines and limiting exposure as she is at increased risk for acquiring the infection and at increased risk for having a more complicated course.\par \par Bloodwork will be sent for CBC, Chem-matthias, TSH, ESR, C-reactive protein, iron studies, B12, folate, vitamin D.  This will be performed at the time of her next Entyvio infusion on June 4.\par \par Patient is due for colonoscopy in February 2021.\par \par Patient will return to see me in 3 months.\par \par \par Plan from 2/27/2020 - Patient with Crohn's disease who now has well-controlled disease on colonoscopy other than active disease in the rectum.  This is consistent with her symptoms of rectal spasm.  Her Entyvio levels are a bit low with negative antibody formation. \par \par I have added Canasa suppository 1000 mg nightly.  I also advised the patient to use Citrucel daily to try to make her bowel movements more regular.\par \par We will keep the Entyvio infusions at the current frequency.  If the patient's symptoms worsen, we can consider increasing the frequency of infusions based on Entyvio levels.\par \par Patient will continue to follow-up with her endocrinologist for her diabetes and hypothyroidism.\par \par We will repeat a colonoscopy in 1 year that is February 2021.\par \par \par Plan from 1/2/2020  - Patient with Crohn's colitis who is now on Entyvio infusions.  She is doing well clinically.\par \par Bloodwork was sent for CBC, Chem-matthias, TSH, ESR, C-reactive protein, iron studies, B12, folate, vitamin D.\par \par Patient will go for Entyvio levels and antibody blood testing prior to her next infusion in February.\par \par A colonoscopy has been scheduled. The risks, benefits, alternatives, and limitations of the procedure, including the possibility of missed lesions, were explained.  The patient will require anesthesia evaluation given her BMI of 40.64.\par \par \par Plan from 5/1/2019 - Patient with Crohn's colitis who has developed antibodies to Humira with undetectable levels. Hand in hand with this, the patient's inflammatory markers are rising. She is doing relatively well clinically although she has fecal urgency.\par \par I had a long discussion with the patient regarding other options. She definitely needs to switch to a different biologic. We agreed to pursue Entyvio infusions. I discussed potential risks.\par \par We will begin the insurance verification process and get the patient connected with an infusion center.\par \par Blood work was sent for quantiferrin gold and hepatitis B and C. serologies.\par \par The patient has started vitamin D 3 2000 international units q.d.\par \par Patient has an appointment with her endocrinologist regarding the very low TSH.\par \par Patient is due for colonoscopy in July.\par \par \par Plan from 4/10/19 - Patient with Crohn's colitis on Humira.\par \par We will draw labs at trough levels of Humira in one and a half weeks. These will include Humira levels and antibodies, CBC, Chem-matthias, TSH, ESR, C-reactive protein, iron studies, B12, folate, vitamin D.\par \par I have renewed hydrocortisone cream for her hemorrhoids.\par \par Patient will return to see me in July. She is due for a colonoscopy at that time.\par \par \par Note from 11/19/18 - Patient with Crohn's colitis who has begun Humira. She is currently doing well.\par \par Patient will continue her current medications.\par \par Blood work was sent for CBC, chem pack, sedimentation rate, C-reactive protein, iron studies, B12, folate.\par \par Patient will return to see me in 3 months.\par \par \par Plan from 10/19/18 - Patient with Crohn's colitis with active inflammation on previous colonoscopy and elevated inflammatory markers on recent blood work. She has had a worsening of her symptoms. At her last visit, we had discussed the possibility of beginning a biologic treatment but she decided to hold off at that time. The patient reports that her gynecologist had concerns regarding impact of the Crohn's disease on gynecologic organs although the patient has no symptoms referable to the urinary tract or any symptoms of fecal output vaginally. The patient has left lower quadrant tenderness on exam.\par \par A long discussion with the patient regarding biologic use. We agreed to begin Humira after discussion regarding the possible risks. We have begun the process of establishing the patient with a specialty pharmacy and beginning insurance verification.\par \par Patient was sent for a CT scan of the abdomen and pelvis to rule out any fistula or abscess.\par \par \par Plan from 8/31/18 - Patient with evidence of Crohn's colitis on colonoscopy with the presence of granulomas on multiple biopsies. The patient is doing well clinically on Apriso bypass active inflammation throughout the colon and elevated inflammatory markers on blood work.\par \par I had a long detailed discussion with the patient regarding our options at this point. We could choose to continue Apriso and altered treatment only if her symptoms change or she showed evidence of a flareup. Alternatively, we can consider a biologic as the patient does have active inflammation.\par \par Blood work was sent for CBC, chem PAC, sedimentation rate, C-reactive protein, B12, folate, iron studies, hepatitis B. and C. serologies, quantiferrin gold.\par \par At the present time, the patient will continue Apriso.\par \par We will repeat a colonoscopy in one year that is July 2019.\par \par Patient will return to see me in 2 months.\par \par \par Plan from 6/18/18 - Patient with ulcerative colitis doing well clinically.\par \par A colonoscopy has been scheduled. The risks, benefits, alternatives, and limitations of the procedure, including the possibility of missed lesions, were explained.\par \par Blood work was sent for CBC, chem pack, sedimentation rate, C-reactive protein, iron studies, B12, folate.\par \par \par Plan from 2/2/18 - Patient doing better after a flareup of her ulcerative colitis. She did not require prednisone. She is now doing well on Apriso.\par \par Patient will continue Apriso 4 tablets a day.\par \par Blood work was sent for CBC, chem pack, sedimentation rate, C-reactive protein, iron studies, B12, folate.\par \par Patient will return to see me in 3 months. She is due for colonoscopy in June.\par \par \par Plan from 1/2/18 - Patient with a flareup of ulcerative colitis. She is starting to improve on Apriso. It is possible that the patient's hyperthyroidism is partially contributing to the diarrhea symptoms.\par \par Patient will continue Apriso for tablets a day.\par \par At this point, the patient was advised to stay off of prednisone.\par \par Patient was given hydrocortisone cream 2.5% to apply to hemorrhoids.\par \par Patient was advised to proceed with radioactive iodine to treat the hyperthyroidism.\par \par Patient will return to see me in one month. We will plan on a colonoscopy in June.\par \par \par Plan from 11/28/17 - Patient with probable ulcerative colitis who has symptoms of diarrhea with bleeding despite being on Lialda.  \par \par Stool studies will be sent for PCR testing, C. diff, lactoferrin, and calprotectin.\par \par Blood work was sent for CBC, sedimentation rate, C-reactive protein, TFTs.\par \par If the above are consistent with ulcerative colitis, the patient will require a short course of steroids.\par \par \par Plan from 8/28/17 - Patient with a pancolitis on colonoscopy. We do not have a clear understanding of whether or not she has true inflammatory bowel disease. Her markers are negative.\par \par Patient will continue on Lialda 4.8 g a day.\par \par Patient will return to see me in one month.\par \par Colonoscopy in June 2018.\par \par \par Plan from 7/5/17 - Patient with pancolitis on colonoscopy. The biopsy findings along with the positive calprotectin and lactoferrin suggests the possibility of IBD. The patient had mild elevation of her LFTs but these have normalized.\par \par Blood work was sent for IBD serology, CBC, sedimentation rate, C-reactive protein.\par \par Patient was started on Lialda 4.8 g a day.\par \par We will repeat a colonoscopy in one year that is June 2018.\par \par Patient has been following with her endocrinologist regarding her hyperthyroidism.\par \par Patient will return to see me in one month.

## 2020-09-15 NOTE — CONSULT LETTER
[FreeTextEntry1] : Dear Dr. Shelton Leavitt ,\[facility] \[facility] I had the pleasure of seeing your patient MARY ALICE REMY in the office today.  My office note is attached. PLEASE READ THE "ASSESSMENT" SECTION OF THE NOTE TO SEE MY IMPRESSION AND PLAN.\par \[facility] Thank you very much for allowing me to participate in the care of your patient.\[facility] \[facility] Sincerely,\[facility] \[facility] Vince Lizarraga M.D., FAC, [facility] Director, Celiac Program at [facility]  of Medicine\[facility] [employer] at [facility] Practice Director,[facility] - Gastroenterology/Internal Medicine at [place]\[facility] [address]\[place], [place] [address] Tel: [phone]\[facility] Email: [email or web address] \[facility] \[facility] The attached note has been created using a voice recognition system (Dragon).  There may be some misspellings and typos.  Please call my office if you have any issues or questions.

## 2020-09-18 ENCOUNTER — OUTPATIENT (OUTPATIENT)
Dept: OUTPATIENT SERVICES | Facility: HOSPITAL | Age: 60
LOS: 1 days | End: 2020-09-18
Payer: COMMERCIAL

## 2020-09-18 ENCOUNTER — APPOINTMENT (OUTPATIENT)
Dept: CT IMAGING | Facility: CLINIC | Age: 60
End: 2020-09-18
Payer: COMMERCIAL

## 2020-09-18 ENCOUNTER — RESULT REVIEW (OUTPATIENT)
Age: 60
End: 2020-09-18

## 2020-09-18 DIAGNOSIS — Z00.8 ENCOUNTER FOR OTHER GENERAL EXAMINATION: ICD-10-CM

## 2020-09-18 PROCEDURE — 74177 CT ABD & PELVIS W/CONTRAST: CPT | Mod: 26

## 2020-09-18 PROCEDURE — 74177 CT ABD & PELVIS W/CONTRAST: CPT

## 2020-09-24 ENCOUNTER — LABORATORY RESULT (OUTPATIENT)
Age: 60
End: 2020-09-24

## 2020-10-01 LAB
25(OH)D3 SERPL-MCNC: 29.1 NG/ML
ALBUMIN SERPL ELPH-MCNC: 4.2 G/DL
ALP BLD-CCNC: 105 U/L
ALT SERPL-CCNC: 14 U/L
ANION GAP SERPL CALC-SCNC: 20 MMOL/L
ANTIBODIES TO VEDOLIZUMAB (ATV) CONCENTRATION: < 1.6 U/ML
AST SERPL-CCNC: 20 U/L
BASOPHILS # BLD AUTO: 0.05 K/UL
BASOPHILS NFR BLD AUTO: 0.5 %
BILIRUB SERPL-MCNC: 0.2 MG/DL
BUN SERPL-MCNC: 8 MG/DL
CALCIUM SERPL-MCNC: 9.5 MG/DL
CHLORIDE SERPL-SCNC: 102 MMOL/L
CO2 SERPL-SCNC: 21 MMOL/L
CREAT SERPL-MCNC: 0.67 MG/DL
CRP SERPL-MCNC: 2.52 MG/DL
EOSINOPHIL # BLD AUTO: 0.35 K/UL
EOSINOPHIL NFR BLD AUTO: 3.8 %
ERYTHROCYTE [SEDIMENTATION RATE] IN BLOOD BY WESTERGREN METHOD: 66 MM/HR
FERRITIN SERPL-MCNC: 124 NG/ML
FOLATE SERPL-MCNC: >20 NG/ML
GLUCOSE SERPL-MCNC: 120 MG/DL
HCT VFR BLD CALC: 45 %
HGB BLD-MCNC: 13.6 G/DL
IMM GRANULOCYTES NFR BLD AUTO: 0.2 %
IRON SATN MFR SERPL: 16 %
IRON SERPL-MCNC: 48 UG/DL
LYMPHOCYTES # BLD AUTO: 2.91 K/UL
LYMPHOCYTES NFR BLD AUTO: 31.6 %
M TB IFN-G BLD-IMP: NEGATIVE
MAN DIFF?: NORMAL
MCHC RBC-ENTMCNC: 27 PG
MCHC RBC-ENTMCNC: 30.2 GM/DL
MCV RBC AUTO: 89.3 FL
MONOCYTES # BLD AUTO: 0.54 K/UL
MONOCYTES NFR BLD AUTO: 5.9 %
NEUTROPHILS # BLD AUTO: 5.34 K/UL
NEUTROPHILS NFR BLD AUTO: 58 %
PLATELET # BLD AUTO: 347 K/UL
POTASSIUM SERPL-SCNC: 4.3 MMOL/L
PROMETHEUS ANSER VDZ: NORMAL
PROMETHEUS LABORATORY FOOTER: NORMAL
PROT SERPL-MCNC: 7.3 G/DL
QUANTIFERON TB PLUS MITOGEN MINUS NIL: 8.19 IU/ML
QUANTIFERON TB PLUS NIL: 0.02 IU/ML
QUANTIFERON TB PLUS TB1 MINUS NIL: 0 IU/ML
QUANTIFERON TB PLUS TB2 MINUS NIL: 0 IU/ML
RBC # BLD: 5.04 M/UL
RBC # FLD: 15 %
SERUM VEDOLIZUMAB (VDZ) CONCENTRATION: 6.4 UG/ML
SODIUM SERPL-SCNC: 143 MMOL/L
TIBC SERPL-MCNC: 301 UG/DL
TSH SERPL-ACNC: 0.04 UIU/ML
UIBC SERPL-MCNC: 252 UG/DL
VIT B12 SERPL-MCNC: 854 PG/ML
WBC # FLD AUTO: 9.21 K/UL

## 2020-10-21 ENCOUNTER — RX RENEWAL (OUTPATIENT)
Age: 60
End: 2020-10-21

## 2021-01-27 DIAGNOSIS — Z01.818 ENCOUNTER FOR OTHER PREPROCEDURAL EXAMINATION: ICD-10-CM

## 2021-01-27 DIAGNOSIS — Z20.822 CONTACT WITH AND (SUSPECTED) EXPOSURE TO COVID-19: ICD-10-CM

## 2021-02-03 ENCOUNTER — NON-APPOINTMENT (OUTPATIENT)
Age: 61
End: 2021-02-03

## 2021-02-03 LAB — SARS-COV-2 N GENE NPH QL NAA+PROBE: NOT DETECTED

## 2021-02-05 ENCOUNTER — APPOINTMENT (OUTPATIENT)
Dept: GASTROENTEROLOGY | Facility: AMBULATORY MEDICAL SERVICES | Age: 61
End: 2021-02-05
Payer: COMMERCIAL

## 2021-02-05 PROCEDURE — 45380 COLONOSCOPY AND BIOPSY: CPT

## 2021-02-09 ENCOUNTER — NON-APPOINTMENT (OUTPATIENT)
Age: 61
End: 2021-02-09

## 2021-02-17 ENCOUNTER — RX RENEWAL (OUTPATIENT)
Age: 61
End: 2021-02-17

## 2021-02-24 ENCOUNTER — NON-APPOINTMENT (OUTPATIENT)
Age: 61
End: 2021-02-24

## 2021-03-16 ENCOUNTER — NON-APPOINTMENT (OUTPATIENT)
Age: 61
End: 2021-03-16

## 2021-03-23 ENCOUNTER — APPOINTMENT (OUTPATIENT)
Dept: GASTROENTEROLOGY | Facility: CLINIC | Age: 61
End: 2021-03-23
Payer: COMMERCIAL

## 2021-03-23 ENCOUNTER — LABORATORY RESULT (OUTPATIENT)
Age: 61
End: 2021-03-23

## 2021-03-23 VITALS
SYSTOLIC BLOOD PRESSURE: 110 MMHG | HEART RATE: 84 BPM | DIASTOLIC BLOOD PRESSURE: 60 MMHG | OXYGEN SATURATION: 9 % | HEIGHT: 65.5 IN | BODY MASS INDEX: 37.37 KG/M2 | TEMPERATURE: 97.3 F | WEIGHT: 227 LBS

## 2021-03-23 PROCEDURE — 99214 OFFICE O/P EST MOD 30 MIN: CPT | Mod: 25

## 2021-03-23 PROCEDURE — 99072 ADDL SUPL MATRL&STAF TM PHE: CPT

## 2021-03-23 PROCEDURE — 36415 COLL VENOUS BLD VENIPUNCTURE: CPT

## 2021-03-23 RX ORDER — SODIUM SULFATE, POTASSIUM SULFATE, MAGNESIUM SULFATE 17.5; 3.13; 1.6 G/ML; G/ML; G/ML
17.5-3.13-1.6 SOLUTION, CONCENTRATE ORAL
Qty: 1 | Refills: 0 | Status: DISCONTINUED | COMMUNITY
Start: 2021-01-27 | End: 2021-03-23

## 2021-03-24 LAB
25(OH)D3 SERPL-MCNC: 38.7 NG/ML
ALBUMIN SERPL ELPH-MCNC: 4.2 G/DL
ALP BLD-CCNC: 92 U/L
ALT SERPL-CCNC: 16 U/L
ANION GAP SERPL CALC-SCNC: 11 MMOL/L
AST SERPL-CCNC: 14 U/L
BASOPHILS # BLD AUTO: 0.06 K/UL
BASOPHILS NFR BLD AUTO: 0.4 %
BILIRUB SERPL-MCNC: 0.2 MG/DL
BUN SERPL-MCNC: 9 MG/DL
CALCIUM SERPL-MCNC: 9.9 MG/DL
CHLORIDE SERPL-SCNC: 105 MMOL/L
CO2 SERPL-SCNC: 24 MMOL/L
CREAT SERPL-MCNC: 0.62 MG/DL
CRP SERPL-MCNC: 8 MG/L
EOSINOPHIL # BLD AUTO: 0.18 K/UL
EOSINOPHIL NFR BLD AUTO: 1.3 %
ERYTHROCYTE [SEDIMENTATION RATE] IN BLOOD BY WESTERGREN METHOD: 56 MM/HR
FERRITIN SERPL-MCNC: 99 NG/ML
FOLATE SERPL-MCNC: >20 NG/ML
GLUCOSE SERPL-MCNC: 88 MG/DL
HCT VFR BLD CALC: 45.9 %
HGB BLD-MCNC: 14.2 G/DL
IMM GRANULOCYTES NFR BLD AUTO: 0.2 %
IRON SATN MFR SERPL: 17 %
IRON SERPL-MCNC: 57 UG/DL
LYMPHOCYTES # BLD AUTO: 3.88 K/UL
LYMPHOCYTES NFR BLD AUTO: 28.2 %
MAN DIFF?: NORMAL
MCHC RBC-ENTMCNC: 26.5 PG
MCHC RBC-ENTMCNC: 30.9 GM/DL
MCV RBC AUTO: 85.6 FL
MONOCYTES # BLD AUTO: 1.02 K/UL
MONOCYTES NFR BLD AUTO: 7.4 %
NEUTROPHILS # BLD AUTO: 8.57 K/UL
NEUTROPHILS NFR BLD AUTO: 62.5 %
PLATELET # BLD AUTO: 394 K/UL
POTASSIUM SERPL-SCNC: 4.3 MMOL/L
PROT SERPL-MCNC: 7.5 G/DL
RBC # BLD: 5.36 M/UL
RBC # FLD: 15.1 %
SODIUM SERPL-SCNC: 141 MMOL/L
TIBC SERPL-MCNC: 339 UG/DL
TSH SERPL-ACNC: <0.01 UIU/ML
UIBC SERPL-MCNC: 282 UG/DL
VIT B12 SERPL-MCNC: 681 PG/ML
WBC # FLD AUTO: 13.74 K/UL
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
## 2021-03-24 NOTE — ASSESSMENT
[FreeTextEntry1] : Patient with Crohn's disease which is under good control except for in the rectum where she has active proctitis.  She is on Entyvio, oral mesalamine, and now mesalamine suppositories.  Symptomatically, the patient is improved.\par \par Patient will continue the current treatment.\[facility] \[facility] Bloodwork was sent for CBC, Chem-matthias, TSH, ESR, C-reactive protein, iron studies, B12, folate, vitamin D.\[facility] \[facility] Patient will have her next colonoscopy in 1 year that is February 2022.\[facility] \[facility] Patient will return to see me in 4 months.\[facility] \par \par Plan from 9/14/2020 - Patient with Crohn's disease on Entyvio and mesalamine suppositories.  She was doing well until the last 2 weeks, when she had developed left lower quadrant pain and has had intermittent episodes of tenesmus and mucus with her stools.  She reports tenderness on self-examination.  She has a history of diverticulitis as well.\[facility] \[facility] Patient was sent for a CT scan of the abdomen and pelvis to rule out diverticulitis or Crohn's related complication including abscess.\[facility] \[facility] Bloodwork will be sent for CBC, Chem-matthias, TSH, ESR, C-reactive protein, iron studies, B12, folate, vitamin D, QuantiFERON gold and Entyvio levels on the morning prior to her Entyvio infusion on September 24.\[facility] \[facility] Patient is due for colonoscopy in February 2021.\[facility] \[facility] Patient has low TSH and is being followed for this.\[facility] \par \par Plan from 5/15/2020 - Patient with Crohn's disease who is doing well on a combination of Entyvio and mesalamine suppositories.  She had active disease in her rectum which appears to be better controlled with the mesalamine.\[facility] \[facility] Patient will continue Entyvio and mesalamine suppositories.\[facility] \[facility] I counseled the patient regarding specific concerns of COVID-19 as the patient is on Entyvio.  She was advised of the importance of strictly following guidelines and limiting exposure as she is at increased risk for acquiring the infection and at increased risk for having a more complicated course.\[facility] \[facility] Bloodwork will be sent for CBC, Chem-matthias, TSH, ESR, C-reactive protein, iron studies, B12, folate, vitamin D.  This will be performed at the time of her next Entyvio infusion on June 4.\par \par Patient is due for colonoscopy in February 2021.\par \par Patient will return to see me in 3 months.\par \par \par Plan from 2/27/2020 - Patient with Crohn's disease who now has well-controlled disease on colonoscopy other than active disease in the rectum.  This is consistent with her symptoms of rectal spasm.  Her Entyvio levels are a bit low with negative antibody formation. \par \par I have added Canasa suppository 1000 mg nightly.  I also advised the patient to use Citrucel daily to try to make her bowel movements more regular.\par \par We will keep the Entyvio infusions at the current frequency.  If the patient's symptoms worsen, we can consider increasing the frequency of infusions based on Entyvio levels.\par \par Patient will continue to follow-up with her endocrinologist for her diabetes and hypothyroidism.\par \par We will repeat a colonoscopy in 1 year that is February 2021.\par \par \par Plan from 1/2/2020  - Patient with Crohn's colitis who is now on Entyvio infusions.  She is doing well clinically.\par \par Bloodwork was sent for CBC, Chem-matthias, TSH, ESR, C-reactive protein, iron studies, B12, folate, vitamin D.\par \par Patient will go for Entyvio levels and antibody blood testing prior to her next infusion in February.\par \par A colonoscopy has been scheduled. The risks, benefits, alternatives, and limitations of the procedure, including the possibility of missed lesions, were explained.  The patient will require anesthesia evaluation given her BMI of 40.64.\par \par \par Plan from 5/1/2019 - Patient with Crohn's colitis who has developed antibodies to Humira with undetectable levels. Hand in hand with this, the patient's inflammatory markers are rising. She is doing relatively well clinically although she has fecal urgency.\par \par I had a long discussion with the patient regarding other options. She definitely needs to switch to a different biologic. We agreed to pursue Entyvio infusions. I discussed potential risks.\par \par We will begin the insurance verification process and get the patient connected with an infusion center.\par \par Blood work was sent for quantiferrin gold and hepatitis B and C. serologies.\par \par The patient has started vitamin D 3 2000 international units q.d.\par \par Patient has an appointment with her endocrinologist regarding the very low TSH.\par \par Patient is due for colonoscopy in July.\par \par \par Plan from 4/10/19 - Patient with Crohn's colitis on Humira.\par \par We will draw labs at trough levels of Humira in one and a half weeks. These will include Humira levels and antibodies, CBC, Chem-matthias, TSH, ESR, C-reactive protein, iron studies, B12, folate, vitamin D.\par \par I have renewed hydrocortisone cream for her hemorrhoids.\par \par Patient will return to see me in July. She is due for a colonoscopy at that time.\par \par \par Note from 11/19/18 - Patient with Crohn's colitis who has begun Humira. She is currently doing well.\par \par Patient will continue her current medications.\par \par Blood work was sent for CBC, chem pack, sedimentation rate, C-reactive protein, iron studies, B12, folate.\par \par Patient will return to see me in 3 months.\par \par \par Plan from 10/19/18 - Patient with Crohn's colitis with active inflammation on previous colonoscopy and elevated inflammatory markers on recent blood work. She has had a worsening of her symptoms. At her last visit, we had discussed the possibility of beginning a biologic treatment but she decided to hold off at that time. The patient reports that her gynecologist had concerns regarding impact of the Crohn's disease on gynecologic organs although the patient has no symptoms referable to the urinary tract or any symptoms of fecal output vaginally. The patient has left lower quadrant tenderness on exam.\par \par A long discussion with the patient regarding biologic use. We agreed to begin Humira after discussion regarding the possible risks. We have begun the process of establishing the patient with a specialty pharmacy and beginning insurance verification.\par \par Patient was sent for a CT scan of the abdomen and pelvis to rule out any fistula or abscess.\par \par \par Plan from 8/31/18 - Patient with evidence of Crohn's colitis on colonoscopy with the presence of granulomas on multiple biopsies. The patient is doing well clinically on Apriso bypass active inflammation throughout the colon and elevated inflammatory markers on blood work.\par \par I had a long detailed discussion with the patient regarding our options at this point. We could choose to continue Apriso and altered treatment only if her symptoms change or she showed evidence of a flareup. Alternatively, we can consider a biologic as the patient does have active inflammation.\par \par Blood work was sent for CBC, chem PAC, sedimentation rate, C-reactive protein, B12, folate, iron studies, hepatitis B. and C. serologies, quantiferrin gold.\par \par At the present time, the patient will continue Apriso.\par \par We will repeat a colonoscopy in one year that is July 2019.\par \par Patient will return to see me in 2 months.\par \par \par Plan from 6/18/18 - Patient with ulcerative colitis doing well clinically.\par \par A colonoscopy has been scheduled. The risks, benefits, alternatives, and limitations of the procedure, including the possibility of missed lesions, were explained.\par \par Blood work was sent for CBC, chem pack, sedimentation rate, C-reactive protein, iron studies, B12, folate.\par \par \par Plan from 2/2/18 - Patient doing better after a flareup of her ulcerative colitis. She did not require prednisone. She is now doing well on Apriso.\par \par Patient will continue Apriso 4 tablets a day.\par \par Blood work was sent for CBC, chem pack, sedimentation rate, C-reactive protein, iron studies, B12, folate.\par \par Patient will return to see me in 3 months. She is due for colonoscopy in June.\par \par \par Plan from 1/2/18 - Patient with a flareup of ulcerative colitis. She is starting to improve on Apriso. It is possible that the patient's hyperthyroidism is partially contributing to the diarrhea symptoms.\par \par Patient will continue Apriso for tablets a day.\par \par At this point, the patient was advised to stay off of prednisone.\par \par Patient was given hydrocortisone cream 2.5% to apply to hemorrhoids.\par \par Patient was advised to proceed with radioactive iodine to treat the hyperthyroidism.\par \par Patient will return to see me in one month. We will plan on a colonoscopy in June.\par \par \par Plan from 11/28/17 - Patient with probable ulcerative colitis who has symptoms of diarrhea with bleeding despite being on Lialda.  \par \par Stool studies will be sent for PCR testing, C. diff, lactoferrin, and calprotectin.\par \par Blood work was sent for CBC, sedimentation rate, C-reactive protein, TFTs.\par \par If the above are consistent with ulcerative colitis, the patient will require a short course of steroids.\par \par \par Plan from 8/28/17 - Patient with a pancolitis on colonoscopy. We do not have a clear understanding of whether or not she has true inflammatory bowel disease. Her markers are negative.\par \par Patient will continue on Lialda 4.8 g a day.\par \par Patient will return to see me in one month.\par \par Colonoscopy in June 2018.\par \par \par Plan from 7/5/17 - Patient with pancolitis on colonoscopy. The biopsy findings along with the positive calprotectin and lactoferrin suggests the possibility of IBD. The patient had mild elevation of her LFTs but these have normalized.\par \par Blood work was sent for IBD serology, CBC, sedimentation rate, C-reactive protein.\par \par Patient was started on Lialda 4.8 g a day.\par \par We will repeat a colonoscopy in one year that is June 2018.\par \par Patient has been following with her endocrinologist regarding her hyperthyroidism.\par \par Patient will return to see me in one month.

## 2021-03-24 NOTE — CONSULT LETTER
[FreeTextEntry1] : Dear Dr. Shelton Leavitt ,\[facility] \[facility] I had the pleasure of seeing your patient MARY ALICE REMY in the office today.  My office note is attached. PLEASE READ THE "ASSESSMENT" SECTION OF THE NOTE TO SEE MY IMPRESSION AND PLAN.\par \[facility] Thank you very much for allowing me to participate in the care of your patient.\[facility] \[facility] Sincerely,\[facility] \[facility] Vince Lizarraga M.D., FAC, [facility] Director, Celiac Program at [facility]  of Medicine\[facility] and [employer] at [facility] Practice Director,[facility] - Gastroenterology/Internal Medicine at [place]\[facility] [address]\[place], [place] [address] Tel: [phone]\[facility] Email: [email or web address] \[facility] \[facility] The attached note has been created using a voice recognition system (Dragon).  There may be some misspellings and typos.  Please call my office if you have any issues or questions.

## 2021-03-26 ENCOUNTER — NON-APPOINTMENT (OUTPATIENT)
Age: 61
End: 2021-03-26

## 2021-06-17 ENCOUNTER — RX RENEWAL (OUTPATIENT)
Age: 61
End: 2021-06-17

## 2021-06-30 ENCOUNTER — NON-APPOINTMENT (OUTPATIENT)
Age: 61
End: 2021-06-30

## 2021-07-19 ENCOUNTER — APPOINTMENT (OUTPATIENT)
Dept: GASTROENTEROLOGY | Facility: CLINIC | Age: 61
End: 2021-07-19
Payer: COMMERCIAL

## 2021-07-19 ENCOUNTER — RX RENEWAL (OUTPATIENT)
Age: 61
End: 2021-07-19

## 2021-07-19 ENCOUNTER — LABORATORY RESULT (OUTPATIENT)
Age: 61
End: 2021-07-19

## 2021-07-19 VITALS
SYSTOLIC BLOOD PRESSURE: 119 MMHG | WEIGHT: 227 LBS | HEIGHT: 65.5 IN | BODY MASS INDEX: 37.37 KG/M2 | HEART RATE: 84 BPM | DIASTOLIC BLOOD PRESSURE: 68 MMHG | OXYGEN SATURATION: 98 % | TEMPERATURE: 97.1 F

## 2021-07-19 PROCEDURE — 99213 OFFICE O/P EST LOW 20 MIN: CPT | Mod: 25

## 2021-07-19 PROCEDURE — 36415 COLL VENOUS BLD VENIPUNCTURE: CPT

## 2021-07-19 PROCEDURE — 99072 ADDL SUPL MATRL&STAF TM PHE: CPT

## 2021-07-20 LAB
25(OH)D3 SERPL-MCNC: 39.2 NG/ML
ALBUMIN SERPL ELPH-MCNC: 4.3 G/DL
ALP BLD-CCNC: 94 U/L
ALT SERPL-CCNC: 14 U/L
ANION GAP SERPL CALC-SCNC: 12 MMOL/L
AST SERPL-CCNC: 18 U/L
BASOPHILS # BLD AUTO: 0.04 K/UL
BASOPHILS NFR BLD AUTO: 0.3 %
BILIRUB SERPL-MCNC: 0.3 MG/DL
BUN SERPL-MCNC: 11 MG/DL
CALCIUM SERPL-MCNC: 10 MG/DL
CHLORIDE SERPL-SCNC: 102 MMOL/L
CO2 SERPL-SCNC: 27 MMOL/L
CREAT SERPL-MCNC: 0.81 MG/DL
CRP SERPL-MCNC: 9 MG/L
EOSINOPHIL # BLD AUTO: 0.25 K/UL
EOSINOPHIL NFR BLD AUTO: 2.1 %
ERYTHROCYTE [SEDIMENTATION RATE] IN BLOOD BY WESTERGREN METHOD: 30 MM/HR
FERRITIN SERPL-MCNC: 98 NG/ML
FOLATE SERPL-MCNC: >20 NG/ML
GLUCOSE SERPL-MCNC: 81 MG/DL
HCT VFR BLD CALC: 45.2 %
HGB BLD-MCNC: 14 G/DL
IMM GRANULOCYTES NFR BLD AUTO: 0.4 %
IRON SATN MFR SERPL: 18 %
IRON SERPL-MCNC: 66 UG/DL
LYMPHOCYTES # BLD AUTO: 3.71 K/UL
LYMPHOCYTES NFR BLD AUTO: 31.5 %
MAN DIFF?: NORMAL
MCHC RBC-ENTMCNC: 26.3 PG
MCHC RBC-ENTMCNC: 31 GM/DL
MCV RBC AUTO: 85 FL
MONOCYTES # BLD AUTO: 0.71 K/UL
MONOCYTES NFR BLD AUTO: 6 %
NEUTROPHILS # BLD AUTO: 7.01 K/UL
NEUTROPHILS NFR BLD AUTO: 59.7 %
PLATELET # BLD AUTO: 340 K/UL
POTASSIUM SERPL-SCNC: 4.4 MMOL/L
PROT SERPL-MCNC: 7.3 G/DL
RBC # BLD: 5.32 M/UL
RBC # FLD: 14.4 %
SODIUM SERPL-SCNC: 140 MMOL/L
TIBC SERPL-MCNC: 367 UG/DL
TSH SERPL-ACNC: <0.01 UIU/ML
UIBC SERPL-MCNC: 300 UG/DL
VIT B12 SERPL-MCNC: 629 PG/ML
WBC # FLD AUTO: 11.77 K/UL

## 2021-07-20 NOTE — CONSULT LETTER
[FreeTextEntry1] : Dear Dr. Shelton Leavitt ,\[facility] \[facility] I had the pleasure of seeing your patient MARY ALICE REMY in the office today.  My office note is attached. PLEASE READ THE "ASSESSMENT" SECTION OF THE NOTE TO SEE MY IMPRESSION AND PLAN.\par \[facility] Thank you very much for allowing me to participate in the care of your patient.\[facility] \[facility] Sincerely,\[facility] \[facility] Vince Lizarraga M.D., FAC, [facility] Director, Celiac Program at [facility]  of Medicine\[facility] [employer] at [facility] Practice Director,[facility] - Gastroenterology/Internal Medicine at [place]\[facility] [address]\[place], [place] [address] Tel: [phone]\[facility] Email: [email or web address] \[facility] \[facility] The attached note has been created using a voice recognition system (Dragon).  There may be some misspellings and typos.  Please call my office if you have any issues or questions.

## 2021-07-20 NOTE — ASSESSMENT
[FreeTextEntry1] : Patient with Crohn's disease who is on Entyvio, oral mesalamine, and rectal mesalamine suppositories.  She is doing well symptomatically.  She did have elevated markers of inflammation on her most recent blood work.\par \par We will continue with current medications.\par \par Bloodwork was sent for CBC, Chem-matthias, TSH, ESR, C-reactive protein, iron studies, B12, folate, vitamin D.\par \par Stool will be sent for calprotectin.\par \par Patient is due for colonoscopy in February 2022.\par \par \par Plan from 3/23/2021 - Patient with Crohn's disease which is under good control except for in the rectum where she has active proctitis.  She is on Entyvio, oral mesalamine, and now mesalamine suppositories.  Symptomatically, the patient is improved.\par \par Patient will continue the current treatment.\[facility] \par Bloodwork was sent for CBC, Chem-matthias, TSH, ESR, C-reactive protein, iron studies, B12, folate, vitamin D.\[facility] \par Patient will have her next colonoscopy in 1 year that is February 2022.\[facility] \par Patient will return to see me in 4 months.\[facility] \par \par Plan from 9/14/2020 - Patient with Crohn's disease on Entyvio and mesalamine suppositories.  She was doing well until the last 2 weeks, when she had developed left lower quadrant pain and has had intermittent episodes of tenesmus and mucus with her stools.  She reports tenderness on self-examination.  She has a history of diverticulitis as well.\[facility] \par Patient was sent for a CT scan of the abdomen and pelvis to rule out diverticulitis or Crohn's related complication including abscess.\[facility] \[facility] Bloodwork will be sent for CBC, Chem-matthias, TSH, ESR, C-reactive protein, iron studies, B12, folate, vitamin D, QuantiFERON gold and Entyvio levels on the morning prior to her Entyvio infusion on September 24.\par \par Patient is due for colonoscopy in February 2021.\par \par Patient has low TSH and is being followed for this.\par \par \par Plan from 5/15/2020 - Patient with Crohn's disease who is doing well on a combination of Entyvio and mesalamine suppositories.  She had active disease in her rectum which appears to be better controlled with the mesalamine.\par \par Patient will continue Entyvio and mesalamine suppositories.\par \par I counseled the patient regarding specific concerns of COVID-19 as the patient is on Entyvio.  She was advised of the importance of strictly following guidelines and limiting exposure as she is at increased risk for acquiring the infection and at increased risk for having a more complicated course.\par \par Bloodwork will be sent for CBC, Chem-matthias, TSH, ESR, C-reactive protein, iron studies, B12, folate, vitamin D.  This will be performed at the time of her next Entyvio infusion on June 4.\par \par Patient is due for colonoscopy in February 2021.\par \par Patient will return to see me in 3 months.\par \par \par Plan from 2/27/2020 - Patient with Crohn's disease who now has well-controlled disease on colonoscopy other than active disease in the rectum.  This is consistent with her symptoms of rectal spasm.  Her Entyvio levels are a bit low with negative antibody formation. \par \par I have added Canasa suppository 1000 mg nightly.  I also advised the patient to use Citrucel daily to try to make her bowel movements more regular.\par \par We will keep the Entyvio infusions at the current frequency.  If the patient's symptoms worsen, we can consider increasing the frequency of infusions based on Entyvio levels.\par \par Patient will continue to follow-up with her endocrinologist for her diabetes and hypothyroidism.\par \par We will repeat a colonoscopy in 1 year that is February 2021.\par \par \par Plan from 1/2/2020  - Patient with Crohn's colitis who is now on Entyvio infusions.  She is doing well clinically.\par \par Bloodwork was sent for CBC, Chem-matthias, TSH, ESR, C-reactive protein, iron studies, B12, folate, vitamin D.\par \par Patient will go for Entyvio levels and antibody blood testing prior to her next infusion in February.\par \par A colonoscopy has been scheduled. The risks, benefits, alternatives, and limitations of the procedure, including the possibility of missed lesions, were explained.  The patient will require anesthesia evaluation given her BMI of 40.64.\par \par \par Plan from 5/1/2019 - Patient with Crohn's colitis who has developed antibodies to Humira with undetectable levels. Hand in hand with this, the patient's inflammatory markers are rising. She is doing relatively well clinically although she has fecal urgency.\par \par I had a long discussion with the patient regarding other options. She definitely needs to switch to a different biologic. We agreed to pursue Entyvio infusions. I discussed potential risks.\par \par We will begin the insurance verification process and get the patient connected with an infusion center.\par \par Blood work was sent for quantiferrin gold and hepatitis B and C. serologies.\par \par The patient has started vitamin D 3 2000 international units q.d.\par \par Patient has an appointment with her endocrinologist regarding the very low TSH.\par \par Patient is due for colonoscopy in July.\par \par \par Plan from 4/10/19 - Patient with Crohn's colitis on Humira.\par \par We will draw labs at trough levels of Humira in one and a half weeks. These will include Humira levels and antibodies, CBC, Chem-matthias, TSH, ESR, C-reactive protein, iron studies, B12, folate, vitamin D.\par \par I have renewed hydrocortisone cream for her hemorrhoids.\par \par Patient will return to see me in July. She is due for a colonoscopy at that time.\par \par \par Note from 11/19/18 - Patient with Crohn's colitis who has begun Humira. She is currently doing well.\par \par Patient will continue her current medications.\par \par Blood work was sent for CBC, chem pack, sedimentation rate, C-reactive protein, iron studies, B12, folate.\par \par Patient will return to see me in 3 months.\par \par \par Plan from 10/19/18 - Patient with Crohn's colitis with active inflammation on previous colonoscopy and elevated inflammatory markers on recent blood work. She has had a worsening of her symptoms. At her last visit, we had discussed the possibility of beginning a biologic treatment but she decided to hold off at that time. The patient reports that her gynecologist had concerns regarding impact of the Crohn's disease on gynecologic organs although the patient has no symptoms referable to the urinary tract or any symptoms of fecal output vaginally. The patient has left lower quadrant tenderness on exam.\par \par A long discussion with the patient regarding biologic use. We agreed to begin Humira after discussion regarding the possible risks. We have begun the process of establishing the patient with a specialty pharmacy and beginning insurance verification.\par \par Patient was sent for a CT scan of the abdomen and pelvis to rule out any fistula or abscess.\par \par \par Plan from 8/31/18 - Patient with evidence of Crohn's colitis on colonoscopy with the presence of granulomas on multiple biopsies. The patient is doing well clinically on Apriso bypass active inflammation throughout the colon and elevated inflammatory markers on blood work.\par \par I had a long detailed discussion with the patient regarding our options at this point. We could choose to continue Apriso and altered treatment only if her symptoms change or she showed evidence of a flareup. Alternatively, we can consider a biologic as the patient does have active inflammation.\par \par Blood work was sent for CBC, chem PAC, sedimentation rate, C-reactive protein, B12, folate, iron studies, hepatitis B. and C. serologies, quantiferrin gold.\par \par At the present time, the patient will continue Apriso.\par \par We will repeat a colonoscopy in one year that is July 2019.\par \par Patient will return to see me in 2 months.\par \par \par Plan from 6/18/18 - Patient with ulcerative colitis doing well clinically.\par \par A colonoscopy has been scheduled. The risks, benefits, alternatives, and limitations of the procedure, including the possibility of missed lesions, were explained.\par \par Blood work was sent for CBC, chem pack, sedimentation rate, C-reactive protein, iron studies, B12, folate.\par \par \par Plan from 2/2/18 - Patient doing better after a flareup of her ulcerative colitis. She did not require prednisone. She is now doing well on Apriso.\par \par Patient will continue Apriso 4 tablets a day.\par \par Blood work was sent for CBC, chem pack, sedimentation rate, C-reactive protein, iron studies, B12, folate.\par \par Patient will return to see me in 3 months. She is due for colonoscopy in June.\par \par \par Plan from 1/2/18 - Patient with a flareup of ulcerative colitis. She is starting to improve on Apriso. It is possible that the patient's hyperthyroidism is partially contributing to the diarrhea symptoms.\par \par Patient will continue Apriso for tablets a day.\par \par At this point, the patient was advised to stay off of prednisone.\par \par Patient was given hydrocortisone cream 2.5% to apply to hemorrhoids.\par \par Patient was advised to proceed with radioactive iodine to treat the hyperthyroidism.\par \par Patient will return to see me in one month. We will plan on a colonoscopy in June.\par \par \par Plan from 11/28/17 - Patient with probable ulcerative colitis who has symptoms of diarrhea with bleeding despite being on Lialda.  \par \par Stool studies will be sent for PCR testing, C. diff, lactoferrin, and calprotectin.\par \par Blood work was sent for CBC, sedimentation rate, C-reactive protein, TFTs.\par \par If the above are consistent with ulcerative colitis, the patient will require a short course of steroids.\par \par \par Plan from 8/28/17 - Patient with a pancolitis on colonoscopy. We do not have a clear understanding of whether or not she has true inflammatory bowel disease. Her markers are negative.\par \par Patient will continue on Lialda 4.8 g a day.\par \par Patient will return to see me in one month.\par \par Colonoscopy in June 2018.\par \par \par Plan from 7/5/17 - Patient with pancolitis on colonoscopy. The biopsy findings along with the positive calprotectin and lactoferrin suggests the possibility of IBD. The patient had mild elevation of her LFTs but these have normalized.\par \par Blood work was sent for IBD serology, CBC, sedimentation rate, C-reactive protein.\par \par Patient was started on Lialda 4.8 g a day.\par \par We will repeat a colonoscopy in one year that is June 2018.\par \par Patient has been following with her endocrinologist regarding her hyperthyroidism.\par \par Patient will return to see me in one month.
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
## 2021-07-21 ENCOUNTER — NON-APPOINTMENT (OUTPATIENT)
Age: 61
End: 2021-07-21

## 2021-08-02 LAB — CALPROTECTIN FECAL: 253 UG/G

## 2021-08-03 ENCOUNTER — NON-APPOINTMENT (OUTPATIENT)
Age: 61
End: 2021-08-03

## 2021-08-04 ENCOUNTER — NON-APPOINTMENT (OUTPATIENT)
Age: 61
End: 2021-08-04

## 2021-09-21 LAB
ANTIBODIES TO VEDOLIZUMAB (ATV) CONCENTRATION: < 1.6 U/ML
PROMETHEUS ANSER VDZ: NORMAL
PROMETHEUS LABORATORY FOOTER: NORMAL
SERUM VEDOLIZUMAB (VDZ) CONCENTRATION: 9.1 UG/ML

## 2021-12-17 ENCOUNTER — NON-APPOINTMENT (OUTPATIENT)
Age: 61
End: 2021-12-17

## 2022-01-07 ENCOUNTER — NON-APPOINTMENT (OUTPATIENT)
Age: 62
End: 2022-01-07

## 2022-01-21 ENCOUNTER — NON-APPOINTMENT (OUTPATIENT)
Age: 62
End: 2022-01-21

## 2022-01-24 ENCOUNTER — APPOINTMENT (OUTPATIENT)
Dept: GASTROENTEROLOGY | Facility: CLINIC | Age: 62
End: 2022-01-24
Payer: COMMERCIAL

## 2022-01-24 ENCOUNTER — LABORATORY RESULT (OUTPATIENT)
Age: 62
End: 2022-01-24

## 2022-01-24 VITALS
SYSTOLIC BLOOD PRESSURE: 130 MMHG | TEMPERATURE: 97.8 F | HEART RATE: 100 BPM | WEIGHT: 225 LBS | BODY MASS INDEX: 37.04 KG/M2 | HEIGHT: 65.5 IN | DIASTOLIC BLOOD PRESSURE: 72 MMHG | OXYGEN SATURATION: 97 %

## 2022-01-24 PROCEDURE — 99214 OFFICE O/P EST MOD 30 MIN: CPT | Mod: 25

## 2022-01-24 PROCEDURE — 99072 ADDL SUPL MATRL&STAF TM PHE: CPT

## 2022-01-24 PROCEDURE — 36415 COLL VENOUS BLD VENIPUNCTURE: CPT

## 2022-01-24 RX ORDER — SEMAGLUTIDE 1.34 MG/ML
2 INJECTION, SOLUTION SUBCUTANEOUS
Qty: 3 | Refills: 0 | Status: ACTIVE | COMMUNITY
Start: 2021-11-23

## 2022-01-24 RX ORDER — HYDROCORTISONE 25 MG/G
2.5 CREAM TOPICAL
Qty: 1 | Refills: 2 | Status: DISCONTINUED | COMMUNITY
Start: 2018-01-02 | End: 2022-01-24

## 2022-01-24 RX ORDER — SEMAGLUTIDE 0.68 MG/ML
INJECTION, SOLUTION SUBCUTANEOUS
Refills: 0 | Status: DISCONTINUED | COMMUNITY
End: 2022-01-24

## 2022-01-24 RX ORDER — METHIMAZOLE 5 MG/1
5 TABLET ORAL
Qty: 15 | Refills: 0 | Status: ACTIVE | COMMUNITY
Start: 2021-12-20

## 2022-01-24 RX ORDER — MESALAMINE 1000 MG/1
1000 SUPPOSITORY RECTAL
Qty: 90 | Refills: 1 | Status: DISCONTINUED | COMMUNITY
Start: 2020-02-27 | End: 2022-01-24

## 2022-01-24 RX ORDER — INSULIN GLARGINE 100 [IU]/ML
INJECTION, SOLUTION SUBCUTANEOUS
Refills: 0 | Status: DISCONTINUED | COMMUNITY
End: 2022-01-24

## 2022-01-24 RX ORDER — ACETAMINOPHEN 325 MG
TABLET ORAL
Refills: 0 | Status: DISCONTINUED | COMMUNITY
End: 2022-01-24

## 2022-01-24 RX ORDER — MESALAMINE 0.38 G/1
0.38 CAPSULE, EXTENDED RELEASE ORAL
Qty: 360 | Refills: 2 | Status: DISCONTINUED | COMMUNITY
Start: 2017-12-19 | End: 2022-01-24

## 2022-01-24 RX ORDER — CLONAZEPAM 0.25 MG/1
0.25 TABLET, ORALLY DISINTEGRATING ORAL
Qty: 30 | Refills: 0 | Status: DISCONTINUED | COMMUNITY
Start: 2018-07-12 | End: 2022-01-24

## 2022-01-24 RX ORDER — INSULIN LISPRO 100 [IU]/ML
INJECTION, SOLUTION INTRAVENOUS; SUBCUTANEOUS
Refills: 0 | Status: DISCONTINUED | COMMUNITY
End: 2022-01-24
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
## 2022-01-28 LAB
25(OH)D3 SERPL-MCNC: 32.7 NG/ML
ALBUMIN SERPL ELPH-MCNC: 4.3 G/DL
ALP BLD-CCNC: 98 U/L
ALT SERPL-CCNC: 16 U/L
ANION GAP SERPL CALC-SCNC: 15 MMOL/L
ANTIBODIES TO VEDOLIZUMAB (ATV) CONCENTRATION: < 1.6 U/ML
AST SERPL-CCNC: 19 U/L
BASOPHILS # BLD AUTO: 0.06 K/UL
BASOPHILS NFR BLD AUTO: 0.6 %
BILIRUB SERPL-MCNC: 0.3 MG/DL
BUN SERPL-MCNC: 8 MG/DL
CALCIUM SERPL-MCNC: 9.5 MG/DL
CHLORIDE SERPL-SCNC: 101 MMOL/L
CO2 SERPL-SCNC: 24 MMOL/L
CREAT SERPL-MCNC: 0.77 MG/DL
CRP SERPL-MCNC: 6 MG/L
EOSINOPHIL # BLD AUTO: 0.28 K/UL
EOSINOPHIL NFR BLD AUTO: 2.8 %
ERYTHROCYTE [SEDIMENTATION RATE] IN BLOOD BY WESTERGREN METHOD: 40 MM/HR
FERRITIN SERPL-MCNC: 107 NG/ML
FOLATE SERPL-MCNC: >20 NG/ML
GLUCOSE SERPL-MCNC: 79 MG/DL
HBV CORE IGG+IGM SER QL: NONREACTIVE
HBV SURFACE AB SER QL: NONREACTIVE
HBV SURFACE AG SER QL: NONREACTIVE
HCT VFR BLD CALC: 47.4 %
HCV AB SER QL: NONREACTIVE
HCV S/CO RATIO: 0.13 S/CO
HGB BLD-MCNC: 14.9 G/DL
IMM GRANULOCYTES NFR BLD AUTO: 0.4 %
IRON SATN MFR SERPL: 21 %
IRON SERPL-MCNC: 76 UG/DL
LYMPHOCYTES # BLD AUTO: 3.03 K/UL
LYMPHOCYTES NFR BLD AUTO: 30.1 %
M TB IFN-G BLD-IMP: NEGATIVE
MAN DIFF?: NORMAL
MCHC RBC-ENTMCNC: 26.8 PG
MCHC RBC-ENTMCNC: 31.4 GM/DL
MCV RBC AUTO: 85.1 FL
MONOCYTES # BLD AUTO: 0.61 K/UL
MONOCYTES NFR BLD AUTO: 6.1 %
NEUTROPHILS # BLD AUTO: 6.03 K/UL
NEUTROPHILS NFR BLD AUTO: 60 %
PLATELET # BLD AUTO: 315 K/UL
POTASSIUM SERPL-SCNC: 4.3 MMOL/L
PROMETHEUS ANSER VDZ: NORMAL
PROMETHEUS LABORATORY FOOTER: NORMAL
PROT SERPL-MCNC: 7.6 G/DL
QUANTIFERON TB PLUS MITOGEN MINUS NIL: 9.95 IU/ML
QUANTIFERON TB PLUS NIL: 0.05 IU/ML
QUANTIFERON TB PLUS TB1 MINUS NIL: -0.01 IU/ML
QUANTIFERON TB PLUS TB2 MINUS NIL: -0.05 IU/ML
RBC # BLD: 5.57 M/UL
RBC # FLD: 14.3 %
SERUM VEDOLIZUMAB (VDZ) CONCENTRATION: 16.7 UG/ML
SODIUM SERPL-SCNC: 140 MMOL/L
TIBC SERPL-MCNC: 365 UG/DL
TSH SERPL-ACNC: <0.01 UIU/ML
UIBC SERPL-MCNC: 288 UG/DL
VIT B12 SERPL-MCNC: 708 PG/ML
WBC # FLD AUTO: 10.05 K/UL

## 2022-01-28 RX ORDER — DIPHENHYDRAMINE HCL 25 MG/1
25 CAPSULE ORAL
Qty: 10 | Refills: 1 | Status: ACTIVE | COMMUNITY
Start: 2022-01-28 | End: 1900-01-01

## 2022-01-31 ENCOUNTER — NON-APPOINTMENT (OUTPATIENT)
Age: 62
End: 2022-01-31

## 2022-02-26 LAB — CALPROTECTIN FECAL: 27 UG/G

## 2022-02-28 ENCOUNTER — NON-APPOINTMENT (OUTPATIENT)
Age: 62
End: 2022-02-28

## 2022-03-07 RX ORDER — VEDOLIZUMAB 300 MG/5ML
300 INJECTION, POWDER, LYOPHILIZED, FOR SOLUTION INTRAVENOUS
Qty: 1 | Refills: 5 | Status: ACTIVE | COMMUNITY
Start: 2019-06-17 | End: 1900-01-01

## 2022-03-15 LAB — SARS-COV-2 N GENE NPH QL NAA+PROBE: NOT DETECTED

## 2022-03-18 ENCOUNTER — APPOINTMENT (OUTPATIENT)
Dept: GASTROENTEROLOGY | Facility: AMBULATORY MEDICAL SERVICES | Age: 62
End: 2022-03-18
Payer: MEDICARE

## 2022-03-18 PROCEDURE — 45380 COLONOSCOPY AND BIOPSY: CPT

## 2022-03-18 RX ORDER — HYDROCORTISONE 25 MG/G
2.5 CREAM TOPICAL
Qty: 1 | Refills: 2 | Status: ACTIVE | COMMUNITY
Start: 2021-02-05 | End: 1900-01-01

## 2022-04-21 ENCOUNTER — NON-APPOINTMENT (OUTPATIENT)
Age: 62
End: 2022-04-21
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
## 2022-05-03 ENCOUNTER — APPOINTMENT (OUTPATIENT)
Dept: ORTHOPEDIC SURGERY | Facility: CLINIC | Age: 62
End: 2022-05-03

## 2022-05-13 NOTE — CONSULT LETTER
[FreeTextEntry1] : Dear Dr. Shelton Leavitt ,\[facility] \[facility] I had the pleasure of seeing your patient MARY ALICE REMY in the office today.  My office note is attached. PLEASE READ THE "ASSESSMENT" SECTION OF THE NOTE TO SEE MY IMPRESSION AND PLAN.\par \[facility] Thank you very much for allowing me to participate in the care of your patient.\[facility] \[facility] Sincerely,\[facility] \[facility] Vince Lizarraga M.D., FAC, [facility] Director, Celiac Program at [facility]  of Medicine\[facility] and [employer] at [facility] Practice Director,[facility] - Gastroenterology/Internal Medicine at [place]\[facility] [address]\[place], [place] [address] Tel: [phone]\[facility] Email: [email or web address] \[facility] \[facility] The attached note has been created using a voice recognition system (Dragon).  There may be some misspellings and typos.  Please call my office if you have any issues or questions. 
cardiac monitor

## 2022-06-06 ENCOUNTER — LABORATORY RESULT (OUTPATIENT)
Age: 62
End: 2022-06-06

## 2022-06-06 ENCOUNTER — APPOINTMENT (OUTPATIENT)
Dept: GASTROENTEROLOGY | Facility: CLINIC | Age: 62
End: 2022-06-06
Payer: COMMERCIAL

## 2022-06-06 VITALS
WEIGHT: 233 LBS | DIASTOLIC BLOOD PRESSURE: 70 MMHG | HEIGHT: 65.5 IN | OXYGEN SATURATION: 97 % | TEMPERATURE: 97.3 F | SYSTOLIC BLOOD PRESSURE: 141 MMHG | HEART RATE: 90 BPM | BODY MASS INDEX: 38.35 KG/M2

## 2022-06-06 PROCEDURE — 99214 OFFICE O/P EST MOD 30 MIN: CPT | Mod: 25

## 2022-06-06 PROCEDURE — 36415 COLL VENOUS BLD VENIPUNCTURE: CPT

## 2022-06-06 RX ORDER — POLYETHYLENE GLYCOL-3350 AND ELECTROLYTES WITH FLAVOR PACK 240; 5.84; 2.98; 6.72; 22.72 G/278.26G; G/278.26G; G/278.26G; G/278.26G; G/278.26G
240 POWDER, FOR SOLUTION ORAL
Qty: 1 | Refills: 0 | Status: DISCONTINUED | COMMUNITY
Start: 2022-01-28 | End: 2022-06-06

## 2022-06-06 RX ORDER — SODIUM SULFATE, POTASSIUM SULFATE, MAGNESIUM SULFATE 17.5; 3.13; 1.6 G/ML; G/ML; G/ML
17.5-3.13-1.6 SOLUTION, CONCENTRATE ORAL
Qty: 1 | Refills: 0 | Status: DISCONTINUED | COMMUNITY
Start: 2022-01-24 | End: 2022-06-06

## 2022-06-06 RX ORDER — MESALAMINE 4 G/60ML
4 ENEMA RECTAL
Qty: 12 | Refills: 1 | Status: DISCONTINUED | COMMUNITY
Start: 2022-01-07 | End: 2022-06-06

## 2022-06-06 NOTE — CONSULT LETTER
[FreeTextEntry1] : Dear Dr. Shelton Leavitt ,\[facility] \[facility] I had the pleasure of seeing your patient MARY ALICE REMY in the office today.  My office note is attached. PLEASE READ THE "ASSESSMENT" SECTION OF THE NOTE TO SEE MY IMPRESSION AND PLAN.\par \[facility] Thank you very much for allowing me to participate in the care of your patient.\[facility] \[facility] Sincerely,\[facility] \[facility] Vince Lizarraga M.D., FAC, [facility] Director, Celiac Program at [facility]  of Medicine\[facility] and [employer] at [facility] Practice Director,[facility] - Gastroenterology/Internal Medicine at [place]\[facility] [address]\[place], [place] [address] Tel: [phone]\[facility] Email: [email or web address] \[facility] \[facility] The attached note has been created using a voice recognition system (Dragon).  There may be some misspellings and typos.  Please call my office if you have any issues or questions.
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
## 2022-06-06 NOTE — ASSESSMENT
[FreeTextEntry1] : Patient with Crohn's disease who is doing well on Entyvio infusions every 6 weeks along with oral mesalamine.  She uses mesalamine suppositories about 3-4 times a week.  Colonoscopy was normal other than active disease in the rectum.\par \par I advised the patient to continue her current medications but to increase the suppository to take it daily.\par \par Bloodwork was sent for CBC, Chem-matthias, TSH, ESR, C-reactive protein, iron studies, B12, folate, vitamin D.\par \par Patient was advised to increase fiber in her diet and to drink at least 64 ounces of water a day.\par \par We will repeat a colonoscopy in March 2023.\par \par Patient will return to see me in 4 months.\par \par \par Plan from 1/24/2022 - Patient with Crohn's disease who is doing well on Entyvio infusions which were increased to every 6 weeks along with oral mesalamine and mesalamine suppositories.  When the oral formulation was changed and the suppositories were stopped and changed to enemas by the insurance company, the patient started feeling worse.  She was unable to tolerate the enemas.\par \par We will try to get the mesalamine suppositories covered.\par \par Bloodwork was sent for CBC, Chem-matthias, TSH, ESR, C-reactive protein, iron studies, B12, folate, vitamin D, hepatitis B and C serologies, quantiferon gold, Entyvio levels (as the patient is having her next infusion tomorrow).\par \par Stool will be sent for fecal calprotectin.\par \par A colonoscopy has been scheduled. The risks, benefits, alternatives, and limitations of the procedure, including the possibility of missed lesions, were explained.\par \par \par Plan from 7/19/2021 - Patient with Crohn's disease who is on Entyvio, oral mesalamine, and rectal mesalamine suppositories.  She is doing well symptomatically.  She did have elevated markers of inflammation on her most recent blood work.\par \par We will continue with current medications.\par \par Bloodwork was sent for CBC, Chem-matthias, TSH, ESR, C-reactive protein, iron studies, B12, folate, vitamin D.\par \par Stool will be sent for calprotectin.\par \par Patient is due for colonoscopy in February 2022.\par \par \par Plan from 3/23/2021 - Patient with Crohn's disease which is under good control except for in the rectum where she has active proctitis.  She is on Entyvio, oral mesalamine, and now mesalamine suppositories.  Symptomatically, the patient is improved.\par \par Patient will continue the current treatment.\par \par Bloodwork was sent for CBC, Chem-matthias, TSH, ESR, C-reactive protein, iron studies, B12, folate, vitamin D.\par \par Patient will have her next colonoscopy in 1 year that is February 2022.\par \par Patient will return to see me in 4 months.\par \par \par Plan from 9/14/2020 - Patient with Crohn's disease on Entyvio and mesalamine suppositories.  She was doing well until the last 2 weeks, when she had developed left lower quadrant pain and has had intermittent episodes of tenesmus and mucus with her stools.  She reports tenderness on self-examination.  She has a history of diverticulitis as well.\[facility] \par Patient was sent for a CT scan of the abdomen and pelvis to rule out diverticulitis or Crohn's related complication including abscess.\[facility] \[facility] Bloodwork will be sent for CBC, Chem-matthias, TSH, ESR, C-reactive protein, iron studies, B12, folate, vitamin D, QuantiFERON gold and Entyvio levels on the morning prior to her Entyvio infusion on September 24.\par \par Patient is due for colonoscopy in February 2021.\par \par Patient has low TSH and is being followed for this.\par \par \par Plan from 5/15/2020 - Patient with Crohn's disease who is doing well on a combination of Entyvio and mesalamine suppositories.  She had active disease in her rectum which appears to be better controlled with the mesalamine.\par \par Patient will continue Entyvio and mesalamine suppositories.\par \par I counseled the patient regarding specific concerns of COVID-19 as the patient is on Entyvio.  She was advised of the importance of strictly following guidelines and limiting exposure as she is at increased risk for acquiring the infection and at increased risk for having a more complicated course.\par \par Bloodwork will be sent for CBC, Chem-matthias, TSH, ESR, C-reactive protein, iron studies, B12, folate, vitamin D.  This will be performed at the time of her next Entyvio infusion on June 4.\par \par Patient is due for colonoscopy in February 2021.\par \par Patient will return to see me in 3 months.\par \par \par Plan from 2/27/2020 - Patient with Crohn's disease who now has well-controlled disease on colonoscopy other than active disease in the rectum.  This is consistent with her symptoms of rectal spasm.  Her Entyvio levels are a bit low with negative antibody formation. \par \par I have added Canasa suppository 1000 mg nightly.  I also advised the patient to use Citrucel daily to try to make her bowel movements more regular.\par \par We will keep the Entyvio infusions at the current frequency.  If the patient's symptoms worsen, we can consider increasing the frequency of infusions based on Entyvio levels.\par \par Patient will continue to follow-up with her endocrinologist for her diabetes and hypothyroidism.\par \par We will repeat a colonoscopy in 1 year that is February 2021.\par \par \par Plan from 1/2/2020  - Patient with Crohn's colitis who is now on Entyvio infusions.  She is doing well clinically.\par \par Bloodwork was sent for CBC, Chem-matthias, TSH, ESR, C-reactive protein, iron studies, B12, folate, vitamin D.\par \par Patient will go for Entyvio levels and antibody blood testing prior to her next infusion in February.\par \par A colonoscopy has been scheduled. The risks, benefits, alternatives, and limitations of the procedure, including the possibility of missed lesions, were explained.  The patient will require anesthesia evaluation given her BMI of 40.64.\par \par \par Plan from 5/1/2019 - Patient with Crohn's colitis who has developed antibodies to Humira with undetectable levels. Hand in hand with this, the patient's inflammatory markers are rising. She is doing relatively well clinically although she has fecal urgency.\par \par I had a long discussion with the patient regarding other options. She definitely needs to switch to a different biologic. We agreed to pursue Entyvio infusions. I discussed potential risks.\par \par We will begin the insurance verification process and get the patient connected with an infusion center.\par \par Blood work was sent for quantiferrin gold and hepatitis B and C. serologies.\par \par The patient has started vitamin D 3 2000 international units q.d.\par \par Patient has an appointment with her endocrinologist regarding the very low TSH.\par \par Patient is due for colonoscopy in July.\par \par \par Plan from 4/10/19 - Patient with Crohn's colitis on Humira.\par \par We will draw labs at trough levels of Humira in one and a half weeks. These will include Humira levels and antibodies, CBC, Chem-matthias, TSH, ESR, C-reactive protein, iron studies, B12, folate, vitamin D.\par \par I have renewed hydrocortisone cream for her hemorrhoids.\par \par Patient will return to see me in July. She is due for a colonoscopy at that time.\par \par \par Note from 11/19/18 - Patient with Crohn's colitis who has begun Humira. She is currently doing well.\par \par Patient will continue her current medications.\par \par Blood work was sent for CBC, chem pack, sedimentation rate, C-reactive protein, iron studies, B12, folate.\par \par Patient will return to see me in 3 months.\par \par \par Plan from 10/19/18 - Patient with Crohn's colitis with active inflammation on previous colonoscopy and elevated inflammatory markers on recent blood work. She has had a worsening of her symptoms. At her last visit, we had discussed the possibility of beginning a biologic treatment but she decided to hold off at that time. The patient reports that her gynecologist had concerns regarding impact of the Crohn's disease on gynecologic organs although the patient has no symptoms referable to the urinary tract or any symptoms of fecal output vaginally. The patient has left lower quadrant tenderness on exam.\par \par A long discussion with the patient regarding biologic use. We agreed to begin Humira after discussion regarding the possible risks. We have begun the process of establishing the patient with a specialty pharmacy and beginning insurance verification.\par \par Patient was sent for a CT scan of the abdomen and pelvis to rule out any fistula or abscess.\par \par \par Plan from 8/31/18 - Patient with evidence of Crohn's colitis on colonoscopy with the presence of granulomas on multiple biopsies. The patient is doing well clinically on Apriso bypass active inflammation throughout the colon and elevated inflammatory markers on blood work.\par \par I had a long detailed discussion with the patient regarding our options at this point. We could choose to continue Apriso and altered treatment only if her symptoms change or she showed evidence of a flareup. Alternatively, we can consider a biologic as the patient does have active inflammation.\par \par Blood work was sent for CBC, chem PAC, sedimentation rate, C-reactive protein, B12, folate, iron studies, hepatitis B. and C. serologies, quantiferrin gold.\par \par At the present time, the patient will continue Apriso.\par \par We will repeat a colonoscopy in one year that is July 2019.\par \par Patient will return to see me in 2 months.\par \par \par Plan from 6/18/18 - Patient with ulcerative colitis doing well clinically.\par \par A colonoscopy has been scheduled. The risks, benefits, alternatives, and limitations of the procedure, including the possibility of missed lesions, were explained.\par \par Blood work was sent for CBC, chem pack, sedimentation rate, C-reactive protein, iron studies, B12, folate.\par \par \par Plan from 2/2/18 - Patient doing better after a flareup of her ulcerative colitis. She did not require prednisone. She is now doing well on Apriso.\par \par Patient will continue Apriso 4 tablets a day.\par \par Blood work was sent for CBC, chem pack, sedimentation rate, C-reactive protein, iron studies, B12, folate.\par \par Patient will return to see me in 3 months. She is due for colonoscopy in June.\par \par \par Plan from 1/2/18 - Patient with a flareup of ulcerative colitis. She is starting to improve on Apriso. It is possible that the patient's hyperthyroidism is partially contributing to the diarrhea symptoms.\par \par Patient will continue Apriso for tablets a day.\par \par At this point, the patient was advised to stay off of prednisone.\par \par Patient was given hydrocortisone cream 2.5% to apply to hemorrhoids.\par \par Patient was advised to proceed with radioactive iodine to treat the hyperthyroidism.\par \par Patient will return to see me in one month. We will plan on a colonoscopy in June.\par \par \par Plan from 11/28/17 - Patient with probable ulcerative colitis who has symptoms of diarrhea with bleeding despite being on Lialda.  \par \par Stool studies will be sent for PCR testing, C. diff, lactoferrin, and calprotectin.\par \par Blood work was sent for CBC, sedimentation rate, C-reactive protein, TFTs.\par \par If the above are consistent with ulcerative colitis, the patient will require a short course of steroids.\par \par \par Plan from 8/28/17 - Patient with a pancolitis on colonoscopy. We do not have a clear understanding of whether or not she has true inflammatory bowel disease. Her markers are negative.\par \par Patient will continue on Lialda 4.8 g a day.\par \par Patient will return to see me in one month.\par \par Colonoscopy in June 2018.\par \par \par Plan from 7/5/17 - Patient with pancolitis on colonoscopy. The biopsy findings along with the positive calprotectin and lactoferrin suggests the possibility of IBD. The patient had mild elevation of her LFTs but these have normalized.\par \par Blood work was sent for IBD serology, CBC, sedimentation rate, C-reactive protein.\par \par Patient was started on Lialda 4.8 g a day.\par \par We will repeat a colonoscopy in one year that is June 2018.\par \par Patient has been following with her endocrinologist regarding her hyperthyroidism.\par \par Patient will return to see me in one month.

## 2022-06-07 LAB
25(OH)D3 SERPL-MCNC: 31.2 NG/ML
ALBUMIN SERPL ELPH-MCNC: 4.4 G/DL
ALP BLD-CCNC: 91 U/L
ALT SERPL-CCNC: 23 U/L
ANION GAP SERPL CALC-SCNC: 12 MMOL/L
AST SERPL-CCNC: 25 U/L
BASOPHILS # BLD AUTO: 0.06 K/UL
BASOPHILS NFR BLD AUTO: 0.6 %
BILIRUB SERPL-MCNC: 0.4 MG/DL
BUN SERPL-MCNC: 6 MG/DL
CALCIUM SERPL-MCNC: 9.9 MG/DL
CHLORIDE SERPL-SCNC: 101 MMOL/L
CO2 SERPL-SCNC: 28 MMOL/L
CREAT SERPL-MCNC: 0.74 MG/DL
CRP SERPL-MCNC: 10 MG/L
EGFR: 93 ML/MIN/1.73M2
EOSINOPHIL # BLD AUTO: 0.97 K/UL
EOSINOPHIL NFR BLD AUTO: 10.1 %
ERYTHROCYTE [SEDIMENTATION RATE] IN BLOOD BY WESTERGREN METHOD: 17 MM/HR
FERRITIN SERPL-MCNC: 115 NG/ML
FOLATE SERPL-MCNC: >20 NG/ML
GLUCOSE SERPL-MCNC: 76 MG/DL
HCT VFR BLD CALC: 44.2 %
HGB BLD-MCNC: 14 G/DL
IMM GRANULOCYTES NFR BLD AUTO: 0.3 %
IRON SATN MFR SERPL: 20 %
IRON SERPL-MCNC: 71 UG/DL
LYMPHOCYTES # BLD AUTO: 3.07 K/UL
LYMPHOCYTES NFR BLD AUTO: 31.9 %
MAN DIFF?: NORMAL
MCHC RBC-ENTMCNC: 27.9 PG
MCHC RBC-ENTMCNC: 31.7 GM/DL
MCV RBC AUTO: 88 FL
MONOCYTES # BLD AUTO: 0.73 K/UL
MONOCYTES NFR BLD AUTO: 7.6 %
NEUTROPHILS # BLD AUTO: 4.75 K/UL
NEUTROPHILS NFR BLD AUTO: 49.5 %
PLATELET # BLD AUTO: 354 K/UL
POTASSIUM SERPL-SCNC: 4.3 MMOL/L
PROT SERPL-MCNC: 7.2 G/DL
RBC # BLD: 5.02 M/UL
RBC # FLD: 14.8 %
SODIUM SERPL-SCNC: 141 MMOL/L
TIBC SERPL-MCNC: 360 UG/DL
TSH SERPL-ACNC: <0.01 UIU/ML
UIBC SERPL-MCNC: 289 UG/DL
VIT B12 SERPL-MCNC: 648 PG/ML
WBC # FLD AUTO: 9.61 K/UL

## 2022-06-28 ENCOUNTER — APPOINTMENT (OUTPATIENT)
Dept: ORTHOPEDIC SURGERY | Facility: CLINIC | Age: 62
End: 2022-06-28
Payer: MEDICARE

## 2022-06-28 VITALS — BODY MASS INDEX: 38.35 KG/M2 | WEIGHT: 233 LBS | HEIGHT: 65.5 IN

## 2022-06-28 DIAGNOSIS — S39.012A STRAIN OF MUSCLE, FASCIA AND TENDON OF LOWER BACK, INITIAL ENCOUNTER: ICD-10-CM

## 2022-06-28 PROCEDURE — 99213 OFFICE O/P EST LOW 20 MIN: CPT

## 2022-06-28 PROCEDURE — 72170 X-RAY EXAM OF PELVIS: CPT

## 2022-06-28 PROCEDURE — 99203 OFFICE O/P NEW LOW 30 MIN: CPT

## 2022-06-28 PROCEDURE — 72100 X-RAY EXAM L-S SPINE 2/3 VWS: CPT

## 2022-06-28 RX ORDER — ACETAMINOPHEN 325 MG/1
325 TABLET ORAL
Qty: 30 | Refills: 0 | Status: COMPLETED | COMMUNITY
Start: 2022-01-28 | End: 2022-06-28

## 2022-06-28 RX ORDER — METHYLPREDNISOLONE 4 MG/1
4 TABLET ORAL
Qty: 21 | Refills: 0 | Status: COMPLETED | COMMUNITY
Start: 2022-06-10

## 2022-06-28 RX ORDER — VALACYCLOVIR 1 G/1
1 TABLET, FILM COATED ORAL
Qty: 30 | Refills: 0 | Status: COMPLETED | COMMUNITY
Start: 2022-05-12

## 2022-06-28 RX ORDER — DOXYCYCLINE 100 MG/1
100 CAPSULE ORAL
Qty: 14 | Refills: 0 | Status: COMPLETED | COMMUNITY
Start: 2022-04-21

## 2022-06-28 RX ORDER — MESALAMINE 1.2 G/1
1.2 TABLET, DELAYED RELEASE ORAL
Qty: 360 | Refills: 1 | Status: COMPLETED | COMMUNITY
Start: 2021-12-17 | End: 2022-06-28

## 2022-06-28 RX ORDER — HYDROXYZINE HYDROCHLORIDE 10 MG/1
10 TABLET ORAL
Qty: 40 | Refills: 0 | Status: COMPLETED | COMMUNITY
Start: 2022-06-09

## 2022-06-28 RX ORDER — CLOBETASOL PROPIONATE 0.5 MG/G
0.05 CREAM TOPICAL
Qty: 90 | Refills: 0 | Status: COMPLETED | COMMUNITY
Start: 2022-05-05

## 2022-06-28 RX ORDER — SEMAGLUTIDE 1.34 MG/ML
4 INJECTION, SOLUTION SUBCUTANEOUS
Qty: 9 | Refills: 0 | Status: COMPLETED | COMMUNITY
Start: 2022-05-04

## 2022-06-28 RX ORDER — BLOOD-GLUCOSE METER
W/DEVICE EACH MISCELLANEOUS
Qty: 1 | Refills: 0 | Status: COMPLETED | COMMUNITY
Start: 2022-01-21

## 2022-06-28 NOTE — DISCUSSION/SUMMARY
[de-identified] : The patient was advised of the diagnosis. The natural history of the pathology was explained in full to the patient in layman's terms. All questions were answered. The risks and benefits of surgical and non-surgical treatment alternatives were explained in full to the patient.\par \par I discussed timing and frequency of the medication with the patient.\par \par Recommend PT.

## 2022-06-28 NOTE — PHYSICAL EXAM
[] : patient ambulates without assistive device [FreeTextEntry8] : L>R [TWNoteComboBox7] : forward flexion 60 degrees [de-identified] : extension 10 degrees [de-identified] : left lateral bending 20 degrees [de-identified] : right lateral bending 20 degrees

## 2022-06-28 NOTE — IMAGING
[Disc space narrowing] : Disc space narrowing [AP] : anteroposterior [There are no fractures, subluxations or dislocations. No significant abnormalities are seen] : There are no fractures, subluxations or dislocations. No significant abnormalities are seen

## 2022-06-28 NOTE — HISTORY OF PRESENT ILLNESS
[de-identified] : Patient presents with low back pain for the past 5-6 weeks. No specific injury. Describes left sided pain and tightness without radiation. Worse with laying flat. She has been taking Flexeril without relief. History of similar right-sided symptoms in the past.

## 2022-07-26 ENCOUNTER — APPOINTMENT (OUTPATIENT)
Dept: ORTHOPEDIC SURGERY | Facility: CLINIC | Age: 62
End: 2022-07-26

## 2022-08-23 ENCOUNTER — APPOINTMENT (OUTPATIENT)
Dept: ORTHOPEDIC SURGERY | Facility: CLINIC | Age: 62
End: 2022-08-23

## 2022-08-23 VITALS — HEIGHT: 65 IN | WEIGHT: 233 LBS | BODY MASS INDEX: 38.82 KG/M2

## 2022-08-23 DIAGNOSIS — G56.01 CARPAL TUNNEL SYNDROME, RIGHT UPPER LIMB: ICD-10-CM

## 2022-08-23 PROCEDURE — 99213 OFFICE O/P EST LOW 20 MIN: CPT

## 2022-08-23 PROCEDURE — 73110 X-RAY EXAM OF WRIST: CPT | Mod: RT

## 2022-08-23 NOTE — HISTORY OF PRESENT ILLNESS
[7] : 7 [5] : 5 [Dull/Aching] : dull/aching [Localized] : localized [Intermittent] : intermittent [Leisure] : leisure [Meds] : meds [Ice] : ice [Physical therapy] : physical therapy [Lying in bed] : lying in bed [de-identified] : Follow up today. Symptoms continue, but have started to improve. Notes morning stiffness. Was going to PT with little relief at first, but she has a new PT and now noticing good improvement. Taking baclofen with relief. \par \par Also c/o new intermittent numbness in the right hand for the past few months. No specific injury. Describes numbness in the first 3 fingers that wakes her from sleep. Minimal symptoms during the day. Denies history of prior injury. [] : no [FreeTextEntry1] : back [FreeTextEntry5] : Patient is here today for follow up on back pain. Patient states that the pain has improved since last visit. Patient has been doing physical therapy 2x a week which has been helping her pain. Pain is the worst when she wakes up in the morning. [de-identified] : Patient has been doing physical therapy 2x a week

## 2022-08-23 NOTE — PHYSICAL EXAM
[Flexion] : flexion [Extension] : extension [5___] : grasp 5[unfilled]/5 [Right] : right hand [There are no fractures, subluxations or dislocations. No significant abnormalities are seen] : There are no fractures, subluxations or dislocations. No significant abnormalities are seen [FreeTextEntry8] : L>R [FreeTextEntry9] : flexion>extension [TWNoteComboBox7] : forward flexion 60 degrees [de-identified] : extension 10 degrees [de-identified] : left lateral bending 20 degrees [de-identified] : right lateral bending 20 degrees [] : no tenderness over wrist

## 2022-08-23 NOTE — DISCUSSION/SUMMARY
[de-identified] : The patient was advised of the diagnosis. The natural history of the pathology was explained in full to the patient in layman's terms. All questions were answered. The risks and benefits of surgical and non-surgical treatment alternatives were explained in full to the patient.\par \par Continue PT. May need mri lumbar spine if symptoms persist\par \par I discussed timing and frequency of the medication with the patient.\par \par Wrist splint at night. May need EMG if symptoms persist.\par \par Discussed carpal tunnel splint and discussed may need mri back. no nsaids due to chron, s disease and due to diabetes

## 2022-09-14 RX ORDER — BACLOFEN 10 MG/1
10 TABLET ORAL 3 TIMES DAILY
Qty: 30 | Refills: 2 | Status: ACTIVE | COMMUNITY
Start: 2022-06-28 | End: 1900-01-01

## 2022-10-04 ENCOUNTER — APPOINTMENT (OUTPATIENT)
Dept: ORTHOPEDIC SURGERY | Facility: CLINIC | Age: 62
End: 2022-10-04

## 2022-10-25 ENCOUNTER — APPOINTMENT (OUTPATIENT)
Dept: ORTHOPEDIC SURGERY | Facility: CLINIC | Age: 62
End: 2022-10-25

## 2022-10-25 VITALS — BODY MASS INDEX: 36.8 KG/M2 | WEIGHT: 229 LBS | HEIGHT: 66 IN

## 2022-10-25 DIAGNOSIS — M51.9 UNSPECIFIED THORACIC, THORACOLUMBAR AND LUMBOSACRAL INTERVERTEBRAL DISC DISORDER: ICD-10-CM

## 2022-10-25 DIAGNOSIS — Z86.39 PERSONAL HISTORY OF OTHER ENDOCRINE, NUTRITIONAL AND METABOLIC DISEASE: ICD-10-CM

## 2022-10-25 DIAGNOSIS — K50.919 CROHN'S DISEASE, UNSPECIFIED, WITH UNSPECIFIED COMPLICATIONS: ICD-10-CM

## 2022-10-25 DIAGNOSIS — M25.562 PAIN IN LEFT KNEE: ICD-10-CM

## 2022-10-25 PROCEDURE — 99214 OFFICE O/P EST MOD 30 MIN: CPT

## 2022-10-25 PROCEDURE — 73562 X-RAY EXAM OF KNEE 3: CPT | Mod: LT

## 2022-10-25 NOTE — IMAGING
[de-identified] : LT Knee Exam: There is a small effusion.  There is no joint line tenderness.  ROM is from 0 to at least 120 degrees of flexion.  Kasia's test is negative.  There is no laxity.  There is no tenderness over either retinaculum.  Tenderness over the medial facet of the patella.\par \par X-ray of the LT knee (3 views) on 10/25/2022:  The medial and lateral compartments show good cartilage width without any reactive change.  Sunrise view shows significant narrowing of the lateral facet with early to moderate reaction, and no glide.\par \par

## 2022-10-25 NOTE — DISCUSSION/SUMMARY
[de-identified] : 1) If the patient's pain continues or worsens, MRI of the LT knee will be considered. \par 2) Rx PT for quadriceps strengthening with patellar protection.\par 3) If the patient's LT knee pain worsens or persists despite current conservative treatment, the patient may receive a CSI injection every 3 months as needed.  I discussed the risks, benefits and alternatives of cortisone steroid injections.  I discussed the risk of temporary increase in blood sugar levels. \par 4) I recommended the patient should lead with the LT leg descending stairs.  The patient should lead with the RT leg when climbing stairs.  She should be cautious with stair climbing.\par 5) I advised the patient to only use compression brace with standing and physical activity, and should remove it when sitting.  I discussed the risk of blood clot formation.\par 6) Pt will continue with activity modification and home exercise as tolerated.  The patient should avoid exercise and activity that aggravates pain. \par 7) PT  Rx for lumbar spine (currently being treated by Dr. Woodruff\par \par The patient will continue with conservative treatment and may F/U PRN.\par \par \par The patient was advised of the diagnosis.  The natural history of the pathology was explained in full to the patient in layman's terms, including but not limited to the risks, symptoms and available options for treatment.  We discussed the risks, benefits and alternatives of the treatment options and the advice I provided to the patient as listed above.  Pt was given the opportunity to ask questions, and all questions were answered.  The discussion was not limited to the above.\par \par Entered by Robert Preciado acting as scribe.\par

## 2022-10-25 NOTE — HISTORY OF PRESENT ILLNESS
[Sudden] : sudden [7] : 7 [1] : 2 [Dull/Aching] : dull/aching [Sharp] : sharp [Intermittent] : intermittent [Household chores] : household chores [Leisure] : leisure [Work] : work [Meds] : meds [Standing] : standing [Walking] : walking [Stairs] : stairs [de-identified] : 10/25/2022: Left knee\par 61 year old female, presents today for pain in the LT knee for the past couple weeks following an injury in which the patient fell over onto her knee when taking a picture standing on stairs of the [facility].  Pt reports that her pain has improved, but she has difficulty with climbing stair sand standing up from a sitting or lying down position.  Pt can walk on a level surface without pain.  Pt denies nocturnal awakening.  Pt denies redness, warmth, giving way or buckling symptoms in the LT knee.  Descending stairs is painful.  Pt reports that she frequently uses a compression brace around the left knee, but she experiences severe pain when taking it off. \par  [] : no [FreeTextEntry1] : Left Knee  [FreeTextEntry3] : 10/11/2022 [FreeTextEntry5] : Pt states she fell on the states of the [facility].  Pt states she has pain for 2 weeks.   [de-identified] : weight bearing [de-identified] : Dr. Leavitt

## 2022-11-01 NOTE — ASSESSMENT
Additional Notes: COVID-19 vaccine received x3 [FreeTextEntry1] : Patient with Crohn's disease who is doing well on Entyvio infusions which were increased to every 6 weeks along with oral mesalamine and mesalamine suppositories.  When the oral formulation was changed and the suppositories were stopped and changed to enemas by the insurance company, the patient started feeling worse.  She was unable to tolerate the enemas.\par \par We will try to get the mesalamine suppositories covered.\par \par Bloodwork was sent for CBC, Chem-matthias, TSH, ESR, C-reactive protein, iron studies, B12, folate, vitamin D, hepatitis B and C serologies, quantiferon gold, Entyvio levels (as the patient is having her next infusion tomorrow).\par \par Stool will be sent for fecal calprotectin.\par \par A colonoscopy has been scheduled. The risks, benefits, alternatives, and limitations of the procedure, including the possibility of missed lesions, were explained.\par \par \par Plan from 7/19/2021 - Patient with Crohn's disease who is on Entyvio, oral mesalamine, and rectal mesalamine suppositories.  She is doing well symptomatically.  She did have elevated markers of inflammation on her most recent blood work.\par \par We will continue with current medications.\par \par Bloodwork was sent for CBC, Chem-matthias, TSH, ESR, C-reactive protein, iron studies, B12, folate, vitamin D.\par \par Stool will be sent for calprotectin.\par \par Patient is due for colonoscopy in February 2022.\par \par \par Plan from 3/23/2021 - Patient with Crohn's disease which is under good control except for in the rectum where she has active proctitis.  She is on Entyvio, oral mesalamine, and now mesalamine suppositories.  Symptomatically, the patient is improved.\par \par Patient will continue the current treatment.\par \par Bloodwork was sent for CBC, Chem-matthias, TSH, ESR, C-reactive protein, iron studies, B12, folate, vitamin D.\par \par Patient will have her next colonoscopy in 1 year that is February 2022.\par \par Patient will return to see me in 4 months.\par \par \par Plan from 9/14/2020 - Patient with Crohn's disease on Entyvio and mesalamine suppositories.  She was doing well until the last 2 weeks, when she had developed left lower quadrant pain and has had intermittent episodes of tenesmus and mucus with her stools.  She reports tenderness on self-examination.  She has a history of diverticulitis as well.\[place] [place] \[facility] [place] Patient was sent for a CT scan of the abdomen and pelvis to rule out diverticulitis or Crohn's related complication including abscess.\[place] [place] \[facility] [place] Bloodwork will be sent for CBC, Chem-matthias, TSH, ESR, C-reactive protein, iron studies, B12, folate, vitamin D, QuantiFERON gold and Entyvio levels on the morning prior to her Entyvio infusion on September 24.\[facility] [place] \[facility] [place] Patient is due for colonoscopy in February 2021.\[facility] [place] \[place] [place] Patient has low TSH and is being followed for this.\[facility] [place] \par \par Plan from 5/15/2020 - Patient with Crohn's disease who is doing well on a combination of Entyvio and mesalamine suppositories.  She had active disease in her rectum which appears to be better controlled with the mesalamine.\[place] [place] \par Patient will continue Entyvio and mesalamine suppositories.\[place] [place] \[place] [place] I counseled the patient regarding specific concerns of COVID-19 as the patient is on Entyvio.  She was advised of the importance of strictly following guidelines and limiting exposure as she is at increased risk for acquiring the infection and at increased risk for having a more complicated course.\[place] [place] \[facility] [place] Bloodwork will be sent for CBC, Chem-matthias, TSH, ESR, C-reactive protein, iron studies, B12, folate, vitamin D.  This will be performed at the time of her next Entyvio infusion on June 4.\[place] [place] \[place] [place] Patient is due for colonoscopy in February 2021.\[place] [place] \[place] [place] Patient will return to see me in 3 months.\[place] [place] \par \par Plan from 2/27/2020 - Patient with Crohn's disease who now has well-controlled disease on colonoscopy other than active disease in the rectum.  This is consistent with her symptoms of rectal spasm.  Her Entyvio levels are a bit low with negative antibody formation. \par \par I have added Canasa suppository 1000 mg nightly.  I also advised the patient to use Citrucel daily to try to make her bowel movements more regular.\par \par We will keep the Entyvio infusions at the current frequency.  If the patient's symptoms worsen, we can consider increasing the frequency of infusions based on Entyvio levels.\par \par Patient will continue to follow-up with her endocrinologist for her diabetes and hypothyroidism.\par \par We will repeat a colonoscopy in 1 year that is February 2021.\par \par \par Plan from 1/2/2020  - Patient with Crohn's colitis who is now on Entyvio infusions.  She is doing well clinically.\par \par Bloodwork was sent for CBC, Chem-matthias, TSH, ESR, C-reactive protein, iron studies, B12, folate, vitamin D.\par \par Patient will go for Entyvio levels and antibody blood testing prior to her next infusion in February.\par \par A colonoscopy has been scheduled. The risks, benefits, alternatives, and limitations of the procedure, including the possibility of missed lesions, were explained.  The patient will require anesthesia evaluation given her BMI of 40.64.\par \par \par Plan from 5/1/2019 - Patient with Crohn's colitis who has developed antibodies to Humira with undetectable levels. Hand in hand with this, the patient's inflammatory markers are rising. She is doing relatively well clinically although she has fecal urgency.\par \par I had a long discussion with the patient regarding other options. She definitely needs to switch to a different biologic. We agreed to pursue Entyvio infusions. I discussed potential risks.\par \par We will begin the insurance verification process and get the patient connected with an infusion center.\par \par Blood work was sent for quantiferrin gold and hepatitis B and C. serologies.\par \par The patient has started vitamin D 3 2000 international units q.d.\par \par Patient has an appointment with her endocrinologist regarding the very low TSH.\par \par Patient is due for colonoscopy in July.\par \par \par Plan from 4/10/19 - Patient with Crohn's colitis on Humira.\par \par We will draw labs at trough levels of Humira in one and a half weeks. These will include Humira levels and antibodies, CBC, Chem-matthias, TSH, ESR, C-reactive protein, iron studies, B12, folate, vitamin D.\par \par I have renewed hydrocortisone cream for her hemorrhoids.\par \par Patient will return to see me in July. She is due for a colonoscopy at that time.\par \par \par Note from 11/19/18 - Patient with Crohn's colitis who has begun Humira. She is currently doing well.\par \par Patient will continue her current medications.\par \par Blood work was sent for CBC, chem pack, sedimentation rate, C-reactive protein, iron studies, B12, folate.\par \par Patient will return to see me in 3 months.\par \par \par Plan from 10/19/18 - Patient with Crohn's colitis with active inflammation on previous colonoscopy and elevated inflammatory markers on recent blood work. She has had a worsening of her symptoms. At her last visit, we had discussed the possibility of beginning a biologic treatment but she decided to hold off at that time. The patient reports that her gynecologist had concerns regarding impact of the Crohn's disease on gynecologic organs although the patient has no symptoms referable to the urinary tract or any symptoms of fecal output vaginally. The patient has left lower quadrant tenderness on exam.\par \par A long discussion with the patient regarding biologic use. We agreed to begin Humira after discussion regarding the possible risks. We have begun the process of establishing the patient with a specialty pharmacy and beginning insurance verification.\par \par Patient was sent for a CT scan of the abdomen and pelvis to rule out any fistula or abscess.\par \par \par Plan from 8/31/18 - Patient with evidence of Crohn's colitis on colonoscopy with the presence of granulomas on multiple biopsies. The patient is doing well clinically on Apriso bypass active inflammation throughout the colon and elevated inflammatory markers on blood work.\par \par I had a long detailed discussion with the patient regarding our options at this point. We could choose to continue Apriso and altered treatment only if her symptoms change or she showed evidence of a flareup. Alternatively, we can consider a biologic as the patient does have active inflammation.\par \par Blood work was sent for CBC, chem PAC, sedimentation rate, C-reactive protein, B12, folate, iron studies, hepatitis B. and C. serologies, quantiferrin gold.\par \par At the present time, the patient will continue Apriso.\par \par We will repeat a colonoscopy in one year that is July 2019.\par \par Patient will return to see me in 2 months.\par \par \par Plan from 6/18/18 - Patient with ulcerative colitis doing well clinically.\par \par A colonoscopy has been scheduled. The risks, benefits, alternatives, and limitations of the procedure, including the possibility of missed lesions, were explained.\par \par Blood work was sent for CBC, chem pack, sedimentation rate, C-reactive protein, iron studies, B12, folate.\par \par \par Plan from 2/2/18 - Patient doing better after a flareup of her ulcerative colitis. She did not require prednisone. She is now doing well on Apriso.\par \par Patient will continue Apriso 4 tablets a day.\par \par Blood work was sent for CBC, chem pack, sedimentation rate, C-reactive protein, iron studies, B12, folate.\par \par Patient will return to see me in 3 months. She is due for colonoscopy in June.\par \par \par Plan from 1/2/18 - Patient with a flareup of ulcerative colitis. She is starting to improve on Apriso. It is possible that the patient's hyperthyroidism is partially contributing to the diarrhea symptoms.\par \par Patient will continue Apriso for tablets a day.\par \par At this point, the patient was advised to stay off of prednisone.\par \par Patient was given hydrocortisone cream 2.5% to apply to hemorrhoids.\par \par Patient was advised to proceed with radioactive iodine to treat the hyperthyroidism.\par \par Patient will return to see me in one month. We will plan on a colonoscopy in June.\par \par \par Plan from 11/28/17 - Patient with probable ulcerative colitis who has symptoms of diarrhea with bleeding despite being on Lialda.  \par \par Stool studies will be sent for PCR testing, C. diff, lactoferrin, and calprotectin.\par \par Blood work was sent for CBC, sedimentation rate, C-reactive protein, TFTs.\par \par If the above are consistent with ulcerative colitis, the patient will require a short course of steroids.\par \par \par Plan from 8/28/17 - Patient with a pancolitis on colonoscopy. We do not have a clear understanding of whether or not she has true inflammatory bowel disease. Her markers are negative.\par \par Patient will continue on Lialda 4.8 g a day.\par \par Patient will return to see me in one month.\par \par Colonoscopy in June 2018.\par \par \par Plan from 7/5/17 - Patient with pancolitis on colonoscopy. The biopsy findings along with the positive calprotectin and lactoferrin suggests the possibility of IBD. The patient had mild elevation of her LFTs but these have normalized.\par \par Blood work was sent for IBD serology, CBC, sedimentation rate, C-reactive protein.\par \par Patient was started on Lialda 4.8 g a day.\par \par We will repeat a colonoscopy in one year that is June 2018.\par \par Patient has been following with her endocrinologist regarding her hyperthyroidism.\par \par Patient will return to see me in one month. Detail Level: Detailed Quality 110: Preventive Care And Screening: Influenza Immunization: Influenza immunization was not ordered or administered, reason not given

## 2022-11-09 ENCOUNTER — APPOINTMENT (OUTPATIENT)
Dept: ORTHOPEDIC SURGERY | Facility: CLINIC | Age: 62
End: 2022-11-09

## 2022-11-09 VITALS — BODY MASS INDEX: 36.8 KG/M2 | WEIGHT: 229 LBS | HEIGHT: 66 IN

## 2022-11-09 DIAGNOSIS — M54.50 LOW BACK PAIN, UNSPECIFIED: ICD-10-CM

## 2022-11-09 PROCEDURE — 99214 OFFICE O/P EST MOD 30 MIN: CPT

## 2022-11-09 NOTE — HISTORY OF PRESENT ILLNESS
[Gradual] : gradual [7] : 7 [8] : 8 [Localized] : localized [Shooting] : shooting [Stabbing] : stabbing [Intermittent] : intermittent [Household chores] : household chores [Leisure] : leisure [Work] : work [Rest] : rest [Ice] : ice [Heat] : heat [Sitting] : sitting [Lying in bed] : lying in bed [de-identified] : 11/09/2022: Lower Back Evaluation\par 61 year old female, presents today for a 30 year Hx of LBP.  Pt reports that she suffered a fall in early October 2021 that worsened her LBP.  Pt was previously treated by Dr. Woodruff and had X-rays of the lumbar spine (2 views) on 8/23/22.  Pt reports Hx of L4-L5 disc herniations.  Pt reports that 12 previous PT sessions provided good strengthening exercises, but then her insurance changed.  Pt reports that her pain can very in intensity, and can change from one side of the lower back to another intermittently, only affecting one side at a time.   Sudden midline pain with spine extension.  Pt reports that her pain is worst in the morning, and can be accompanied by spasms.  Pt denies any radicular symptoms and/or N/T.  Patient also has a Hx of facet blocks approximately 4 years ago which were followed by a 4 month period of relief.  RF ablation had not been performed.  Pt reports she had a 15lb weight gain since last year.\par \par Pt has a Hx of diabetes. \par \par  [] : no [FreeTextEntry1] : Lower Back  [FreeTextEntry5] : PT states she has a herniated disc L4 L5.  Pt states this has been going on and off for over 6 months.   [de-identified] : Morning  [de-identified] : August 2022 [de-identified] : Dr. Woodruff  [de-identified] : September 2022

## 2022-11-09 NOTE — DISCUSSION/SUMMARY
[de-identified] : 1) **MRI will be performed to determine the origin of the patient's pain.  MRI will be performed in anticipation for consultation with a pain management specialist for possible facet block therapy.  Patient is an excellent candidate for facet block therapy. \par 2) **Rx PT for core strengthening.\par 3) The patient may take OTC NSAIDs PRN for pain. \par 4) I recommend the patient diet and lose weight, which would be expected to reduce weight bearing pressure on the lumbar spine.\par 5) Pt will continue with activity modification and home exercise as tolerated.  The patient should avoid exercise and activity that aggravates pain. \par \par \par The patient will continue with conservative treatment as described above, and will F/U in 6 weeks after pain management.\par \par NSAIDs- The patient was informed of the risks of this medication to GI tract, increased blood pressure, cardiac risk, and risk of fluid retention. Advised to clear medication with internist or PCP if any concurrent health problem with heart, blood pressure, or GI system exists.  The risks, benefits and alternatives of NSAIDs were discussed.  The patient was instructed on the proper usage of  NSAIDs. \par \par The patient was advised of the diagnosis.  The natural history of the pathology was explained in full to the patient in layman's terms, including but not limited to the risks, symptoms and available options for treatment.  We discussed the risks, benefits and alternatives of the treatment options and the advice I provided to the patient as listed above.  Pt was given the opportunity to ask questions, and all questions were answered.  The discussion was not limited to the above.\par \par Entered by Robert Preciado acting as scribe.\par

## 2022-11-09 NOTE — ASSESSMENT
[FreeTextEntry1] : Patient is an excellent candidate for facet block therapy.  This is based on today's finding of sudden midline pain with spine extension.  Patient also has a Hx of facet blocks approximately 4 years ago which were followed by a 4 month period of relief.\par \par Mechanical LBP of facet origin

## 2022-11-09 NOTE — IMAGING
[de-identified] : Lumbar Spine Exam: The para lumbar muscle tone is greater than average.  Lumbar flexion range is 70 degrees, and the lordosis normally reverses.  Extension is immediately painful over the lumbar midline, and is limited to less than 5 degrees.  RT lateral flexion produces RT-sided LBP and is half of full.  LT lateral flexion produces a mild pain response, with a greater range than RT lateral flexion.  Patient is able to heel walk and toe walk well.  SLR is negative bilaterally.  DTR of the knees are 2+ equal.  DTR of the ankles are 1+ equal.\par \par X-Ray of the Lumbar Spine (2 views) on 9/28/2022:

## 2022-11-17 ENCOUNTER — APPOINTMENT (OUTPATIENT)
Dept: GASTROENTEROLOGY | Facility: CLINIC | Age: 62
End: 2022-11-17

## 2022-11-17 VITALS
WEIGHT: 232 LBS | OXYGEN SATURATION: 96 % | DIASTOLIC BLOOD PRESSURE: 70 MMHG | TEMPERATURE: 97.3 F | HEART RATE: 77 BPM | BODY MASS INDEX: 37.28 KG/M2 | HEIGHT: 66 IN | SYSTOLIC BLOOD PRESSURE: 120 MMHG

## 2022-11-17 PROCEDURE — 99213 OFFICE O/P EST LOW 20 MIN: CPT | Mod: 25

## 2022-11-20 LAB
25(OH)D3 SERPL-MCNC: 31.6 NG/ML
ALBUMIN SERPL ELPH-MCNC: 4.4 G/DL
ALP BLD-CCNC: 90 U/L
ALT SERPL-CCNC: 13 U/L
ANION GAP SERPL CALC-SCNC: 11 MMOL/L
AST SERPL-CCNC: 23 U/L
BASOPHILS # BLD AUTO: 0.09 K/UL
BASOPHILS NFR BLD AUTO: 0.8 %
BILIRUB SERPL-MCNC: 0.3 MG/DL
BUN SERPL-MCNC: 11 MG/DL
CALCIUM SERPL-MCNC: 10.2 MG/DL
CHLORIDE SERPL-SCNC: 100 MMOL/L
CO2 SERPL-SCNC: 30 MMOL/L
CREAT SERPL-MCNC: 0.78 MG/DL
CRP SERPL-MCNC: 11 MG/L
EGFR: 87 ML/MIN/1.73M2
EOSINOPHIL # BLD AUTO: 0.78 K/UL
EOSINOPHIL NFR BLD AUTO: 7.1 %
ERYTHROCYTE [SEDIMENTATION RATE] IN BLOOD BY WESTERGREN METHOD: 40 MM/HR
FERRITIN SERPL-MCNC: 101 NG/ML
FOLATE SERPL-MCNC: >20 NG/ML
GLUCOSE SERPL-MCNC: 83 MG/DL
HCT VFR BLD CALC: 45.7 %
HGB BLD-MCNC: 14.3 G/DL
IMM GRANULOCYTES NFR BLD AUTO: 0.3 %
IRON SATN MFR SERPL: 20 %
IRON SERPL-MCNC: 72 UG/DL
LYMPHOCYTES # BLD AUTO: 2.79 K/UL
LYMPHOCYTES NFR BLD AUTO: 25.5 %
MAN DIFF?: NORMAL
MCHC RBC-ENTMCNC: 27.2 PG
MCHC RBC-ENTMCNC: 31.3 GM/DL
MCV RBC AUTO: 86.9 FL
MONOCYTES # BLD AUTO: 0.77 K/UL
MONOCYTES NFR BLD AUTO: 7.1 %
NEUTROPHILS # BLD AUTO: 6.46 K/UL
NEUTROPHILS NFR BLD AUTO: 59.2 %
PLATELET # BLD AUTO: 316 K/UL
POTASSIUM SERPL-SCNC: 4.1 MMOL/L
PROT SERPL-MCNC: 7.2 G/DL
RBC # BLD: 5.26 M/UL
RBC # FLD: 14.5 %
SODIUM SERPL-SCNC: 141 MMOL/L
TIBC SERPL-MCNC: 352 UG/DL
TSH SERPL-ACNC: 1.17 UIU/ML
UIBC SERPL-MCNC: 280 UG/DL
VIT B12 SERPL-MCNC: 542 PG/ML
WBC # FLD AUTO: 10.92 K/UL

## 2022-11-20 NOTE — CONSULT LETTER
[FreeTextEntry1] : Dear Dr. Shelton Leavitt ,\par \par I had the pleasure of seeing your patient MARY ALICE REMY in the office today.  My office note is attached. PLEASE READ THE "ASSESSMENT" SECTION OF THE NOTE TO SEE MY IMPRESSION AND PLAN.\par \par Thank you very much for allowing me to participate in the care of your patient.\par \par Sincerely,\par \par Vince Lizarraga M.D., FAC, FACP\par Director, Celiac Program at [facility]\par  of Medicine, [employer] [facility] Adjunct  of Medicine, [facility] Practice Director, [facility] - Gastroenterology at [facility] [address]\[place] [address]\par Tel: [phone]\par Email: [email or web address]\par \par \par The attached note has been created using a voice recognition system (Dragon).  There may be some misspellings and typos.  Please call my office if you have any issues or questions.

## 2022-11-20 NOTE — ASSESSMENT
[FreeTextEntry1] : ResultPatient with Crohn's disease who is on Entyvio infusions every 6 weeks, form of mesalamine 4 pills a day, and mesalamine suppositories at bedtime.  She is doing well although she gets increased frequency of solid stools once a week with rectal spasm.\par \par Bloodwork was sent for CBC, Chem-matthias, TSH, ESR, C-reactive protein, iron studies, B12, folate, vitamin D.\par \par Patient will continue her current medications.\par \par Patient is due for colonoscopy in March 2023.\par \par \par Plan from 6/6/2022 - Patient with Crohn's disease who is doing well on Entyvio infusions every 6 weeks along with oral mesalamine.  She uses mesalamine suppositories about 3-4 times a week.  Colonoscopy was normal other than active disease in the rectum.\par \par I advised the patient to continue her current medications but to increase the suppository to take it daily.\par \par Bloodwork was sent for CBC, Chem-matthias, TSH, ESR, C-reactive protein, iron studies, B12, folate, vitamin D.\par \par Patient was advised to increase fiber in her diet and to drink at least 64 ounces of water a day.\par \par We will repeat a colonoscopy in March 2023.\par \par Patient will return to see me in 4 months.\par \par \par Plan from 1/24/2022 - Patient with Crohn's disease who is doing well on Entyvio infusions which were increased to every 6 weeks along with oral mesalamine and mesalamine suppositories.  When the oral formulation was changed and the suppositories were stopped and changed to enemas by the insurance company, the patient started feeling worse.  She was unable to tolerate the enemas.\par \par We will try to get the mesalamine suppositories covered.\par \par Bloodwork was sent for CBC, Chem-matthias, TSH, ESR, C-reactive protein, iron studies, B12, folate, vitamin D, hepatitis B and C serologies, quantiferon gold, Entyvio levels (as the patient is having her next infusion tomorrow).\par \par Stool will be sent for fecal calprotectin.\par \par A colonoscopy has been scheduled. The risks, benefits, alternatives, and limitations of the procedure, including the possibility of missed lesions, were explained.\par \par \par Plan from 7/19/2021 - Patient with Crohn's disease who is on Entyvio, oral mesalamine, and rectal mesalamine suppositories.  She is doing well symptomatically.  She did have elevated markers of inflammation on her most recent blood work.\par \par We will continue with current medications.\[facility] \par Bloodwork was sent for CBC, Chem-matthias, TSH, ESR, C-reactive protein, iron studies, B12, folate, vitamin D.\par \par Stool will be sent for calprotectin.\par \par Patient is due for colonoscopy in February 2022.\[facility] \par \par Plan from 3/23/2021 - Patient with Crohn's disease which is under good control except for in the rectum where she has active proctitis.  She is on Entyvio, oral mesalamine, and now mesalamine suppositories.  Symptomatically, the patient is improved.\par \par Patient will continue the current treatment.\[facility] \[facility] Bloodwork was sent for CBC, Chem-matthias, TSH, ESR, C-reactive protein, iron studies, B12, folate, vitamin D.\[facility] \par Patient will have her next colonoscopy in 1 year that is February 2022.\[facility] \par Patient will return to see me in 4 months.\[facility] \par \par Plan from 9/14/2020 - Patient with Crohn's disease on Entyvio and mesalamine suppositories.  She was doing well until the last 2 weeks, when she had developed left lower quadrant pain and has had intermittent episodes of tenesmus and mucus with her stools.  She reports tenderness on self-examination.  She has a history of diverticulitis as well.\[facility] \[facility] Patient was sent for a CT scan of the abdomen and pelvis to rule out diverticulitis or Crohn's related complication including abscess.\[facility] \[facility] Bloodwork will be sent for CBC, Chem-matthias, TSH, ESR, C-reactive protein, iron studies, B12, folate, vitamin D, QuantiFERON gold and Entyvio levels on the morning prior to her Entyvio infusion on September 24.\[facility] \par Patient is due for colonoscopy in February 2021.\par \par Patient has low TSH and is being followed for this.\par \par \par Plan from 5/15/2020 - Patient with Crohn's disease who is doing well on a combination of Entyvio and mesalamine suppositories.  She had active disease in her rectum which appears to be better controlled with the mesalamine.\par \par Patient will continue Entyvio and mesalamine suppositories.\par \par I counseled the patient regarding specific concerns of COVID-19 as the patient is on Entyvio.  She was advised of the importance of strictly following guidelines and limiting exposure as she is at increased risk for acquiring the infection and at increased risk for having a more complicated course.\par \par Bloodwork will be sent for CBC, Chem-matthias, TSH, ESR, C-reactive protein, iron studies, B12, folate, vitamin D.  This will be performed at the time of her next Entyvio infusion on June 4.\par \par Patient is due for colonoscopy in February 2021.\par \par Patient will return to see me in 3 months.\par \par \par Plan from 2/27/2020 - Patient with Crohn's disease who now has well-controlled disease on colonoscopy other than active disease in the rectum.  This is consistent with her symptoms of rectal spasm.  Her Entyvio levels are a bit low with negative antibody formation. \par \par I have added Canasa suppository 1000 mg nightly.  I also advised the patient to use Citrucel daily to try to make her bowel movements more regular.\par \par We will keep the Entyvio infusions at the current frequency.  If the patient's symptoms worsen, we can consider increasing the frequency of infusions based on Entyvio levels.\par \par Patient will continue to follow-up with her endocrinologist for her diabetes and hypothyroidism.\par \par We will repeat a colonoscopy in 1 year that is February 2021.\par \par \par Plan from 1/2/2020  - Patient with Crohn's colitis who is now on Entyvio infusions.  She is doing well clinically.\par \par Bloodwork was sent for CBC, Chem-matthias, TSH, ESR, C-reactive protein, iron studies, B12, folate, vitamin D.\par \par Patient will go for Entyvio levels and antibody blood testing prior to her next infusion in February.\par \par A colonoscopy has been scheduled. The risks, benefits, alternatives, and limitations of the procedure, including the possibility of missed lesions, were explained.  The patient will require anesthesia evaluation given her BMI of 40.64.\par \par \par Plan from 5/1/2019 - Patient with Crohn's colitis who has developed antibodies to Humira with undetectable levels. Hand in hand with this, the patient's inflammatory markers are rising. She is doing relatively well clinically although she has fecal urgency.\par \par I had a long discussion with the patient regarding other options. She definitely needs to switch to a different biologic. We agreed to pursue Entyvio infusions. I discussed potential risks.\par \par We will begin the insurance verification process and get the patient connected with an infusion center.\par \par Blood work was sent for quantiferrin gold and hepatitis B and C. serologies.\par \par The patient has started vitamin D 3 2000 international units q.d.\par \par Patient has an appointment with her endocrinologist regarding the very low TSH.\par \par Patient is due for colonoscopy in July.\par \par \par Plan from 4/10/19 - Patient with Crohn's colitis on Humira.\par \par We will draw labs at trough levels of Humira in one and a half weeks. These will include Humira levels and antibodies, CBC, Chem-matthias, TSH, ESR, C-reactive protein, iron studies, B12, folate, vitamin D.\par \par I have renewed hydrocortisone cream for her hemorrhoids.\par \par Patient will return to see me in July. She is due for a colonoscopy at that time.\par \par \par Note from 11/19/18 - Patient with Crohn's colitis who has begun Humira. She is currently doing well.\par \par Patient will continue her current medications.\par \par Blood work was sent for CBC, chem pack, sedimentation rate, C-reactive protein, iron studies, B12, folate.\par \par Patient will return to see me in 3 months.\par \par \par Plan from 10/19/18 - Patient with Crohn's colitis with active inflammation on previous colonoscopy and elevated inflammatory markers on recent blood work. She has had a worsening of her symptoms. At her last visit, we had discussed the possibility of beginning a biologic treatment but she decided to hold off at that time. The patient reports that her gynecologist had concerns regarding impact of the Crohn's disease on gynecologic organs although the patient has no symptoms referable to the urinary tract or any symptoms of fecal output vaginally. The patient has left lower quadrant tenderness on exam.\par \par A long discussion with the patient regarding biologic use. We agreed to begin Humira after discussion regarding the possible risks. We have begun the process of establishing the patient with a specialty pharmacy and beginning insurance verification.\par \par Patient was sent for a CT scan of the abdomen and pelvis to rule out any fistula or abscess.\par \par \par Plan from 8/31/18 - Patient with evidence of Crohn's colitis on colonoscopy with the presence of granulomas on multiple biopsies. The patient is doing well clinically on Apriso bypass active inflammation throughout the colon and elevated inflammatory markers on blood work.\par \par I had a long detailed discussion with the patient regarding our options at this point. We could choose to continue Apriso and altered treatment only if her symptoms change or she showed evidence of a flareup. Alternatively, we can consider a biologic as the patient does have active inflammation.\par \par Blood work was sent for CBC, chem PAC, sedimentation rate, C-reactive protein, B12, folate, iron studies, hepatitis B. and C. serologies, quantiferrin gold.\par \par At the present time, the patient will continue Apriso.\par \par We will repeat a colonoscopy in one year that is July 2019.\par \par Patient will return to see me in 2 months.\par \par \par Plan from 6/18/18 - Patient with ulcerative colitis doing well clinically.\par \par A colonoscopy has been scheduled. The risks, benefits, alternatives, and limitations of the procedure, including the possibility of missed lesions, were explained.\par \par Blood work was sent for CBC, chem pack, sedimentation rate, C-reactive protein, iron studies, B12, folate.\par \par \par Plan from 2/2/18 - Patient doing better after a flareup of her ulcerative colitis. She did not require prednisone. She is now doing well on Apriso.\par \par Patient will continue Apriso 4 tablets a day.\par \par Blood work was sent for CBC, chem pack, sedimentation rate, C-reactive protein, iron studies, B12, folate.\par \par Patient will return to see me in 3 months. She is due for colonoscopy in June.\par \par \par Plan from 1/2/18 - Patient with a flareup of ulcerative colitis. She is starting to improve on Apriso. It is possible that the patient's hyperthyroidism is partially contributing to the diarrhea symptoms.\par \par Patient will continue Apriso for tablets a day.\par \par At this point, the patient was advised to stay off of prednisone.\par \par Patient was given hydrocortisone cream 2.5% to apply to hemorrhoids.\par \par Patient was advised to proceed with radioactive iodine to treat the hyperthyroidism.\par \par Patient will return to see me in one month. We will plan on a colonoscopy in June.\par \par \par Plan from 11/28/17 - Patient with probable ulcerative colitis who has symptoms of diarrhea with bleeding despite being on Lialda.  \par \par Stool studies will be sent for PCR testing, C. diff, lactoferrin, and calprotectin.\par \par Blood work was sent for CBC, sedimentation rate, C-reactive protein, TFTs.\par \par If the above are consistent with ulcerative colitis, the patient will require a short course of steroids.\par \par \par Plan from 8/28/17 - Patient with a pancolitis on colonoscopy. We do not have a clear understanding of whether or not she has true inflammatory bowel disease. Her markers are negative.\par \par Patient will continue on Lialda 4.8 g a day.\par \par Patient will return to see me in one month.\par \par Colonoscopy in June 2018.\par \par \par Plan from 7/5/17 - Patient with pancolitis on colonoscopy. The biopsy findings along with the positive calprotectin and lactoferrin suggests the possibility of IBD. The patient had mild elevation of her LFTs but these have normalized.\par \par Blood work was sent for IBD serology, CBC, sedimentation rate, C-reactive protein.\par \par Patient was started on Lialda 4.8 g a day.\par \par We will repeat a colonoscopy in one year that is June 2018.\par \par Patient has been following with her endocrinologist regarding her hyperthyroidism.\par \par Patient will return to see me in one month.
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
## 2022-11-21 ENCOUNTER — APPOINTMENT (OUTPATIENT)
Dept: MRI IMAGING | Facility: CLINIC | Age: 62
End: 2022-11-21

## 2022-11-21 ENCOUNTER — NON-APPOINTMENT (OUTPATIENT)
Age: 62
End: 2022-11-21

## 2022-11-28 ENCOUNTER — APPOINTMENT (OUTPATIENT)
Dept: MRI IMAGING | Facility: CLINIC | Age: 62
End: 2022-11-28

## 2022-11-29 ENCOUNTER — APPOINTMENT (OUTPATIENT)
Dept: PAIN MANAGEMENT | Facility: CLINIC | Age: 62
End: 2022-11-29

## 2022-12-01 ENCOUNTER — APPOINTMENT (OUTPATIENT)
Dept: ORTHOPEDIC SURGERY | Facility: CLINIC | Age: 62
End: 2022-12-01

## 2022-12-10 ENCOUNTER — RX RENEWAL (OUTPATIENT)
Age: 62
End: 2022-12-10

## 2023-02-23 DIAGNOSIS — Z01.812 ENCOUNTER FOR PREPROCEDURAL LABORATORY EXAMINATION: ICD-10-CM

## 2023-02-23 DIAGNOSIS — Z20.822 ENCOUNTER FOR PREPROCEDURAL LABORATORY EXAMINATION: ICD-10-CM

## 2023-03-13 ENCOUNTER — APPOINTMENT (OUTPATIENT)
Dept: GASTROENTEROLOGY | Facility: AMBULATORY MEDICAL SERVICES | Age: 63
End: 2023-03-13
Payer: MEDICAID

## 2023-03-13 PROCEDURE — 45380 COLONOSCOPY AND BIOPSY: CPT

## 2023-04-19 ENCOUNTER — APPOINTMENT (OUTPATIENT)
Dept: GASTROENTEROLOGY | Facility: CLINIC | Age: 63
End: 2023-04-19
Payer: COMMERCIAL

## 2023-04-19 VITALS
BODY MASS INDEX: 38.8 KG/M2 | DIASTOLIC BLOOD PRESSURE: 65 MMHG | TEMPERATURE: 97.1 F | WEIGHT: 241.4 LBS | HEART RATE: 72 BPM | SYSTOLIC BLOOD PRESSURE: 114 MMHG | OXYGEN SATURATION: 97 % | HEIGHT: 66 IN

## 2023-04-19 DIAGNOSIS — K64.4 RESIDUAL HEMORRHOIDAL SKIN TAGS: ICD-10-CM

## 2023-04-19 PROCEDURE — 99214 OFFICE O/P EST MOD 30 MIN: CPT

## 2023-04-19 RX ORDER — HYDROCORTISONE 2.5% 25 MG/G
2.5 CREAM TOPICAL
Qty: 1 | Refills: 2 | Status: ACTIVE | COMMUNITY
Start: 2023-04-19 | End: 1900-01-01

## 2023-04-19 RX ORDER — HYDROCORTISONE ACETATE 25 MG/1
25 SUPPOSITORY RECTAL TWICE DAILY
Qty: 180 | Refills: 2 | Status: ACTIVE | COMMUNITY
Start: 2023-04-19 | End: 1900-01-01

## 2023-04-20 NOTE — CONSULT LETTER
[FreeTextEntry1] : Dear Dr. Shelton Leavitt ,\par \par I had the pleasure of seeing your patient MARY ALICE REMY in the office today.  My office note is attached. PLEASE READ THE "ASSESSMENT" SECTION OF THE NOTE TO SEE MY IMPRESSION AND PLAN.\par \par Thank you very much for allowing me to participate in the care of your patient.\par \par Sincerely,\par \par Vince Lizarraga M.D., FAC, FACP\par Director, Celiac Program at [facility]\par  of Medicine, [employer] at [facility] Adjunct  of Medicine, [facility] Practice Director, [facility] - Gastroenterology at [facility] [address]\[place] [address]\par Tel: [phone]\par Email: [email or web address] \par \par The attached note has been created using a voice recognition system (Dragon).  There may be some misspellings and typos.  Please call my office if you have any issues or questions.
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
## 2023-04-20 NOTE — ASSESSMENT
[FreeTextEntry1] : Patient with Crohn's disease who has evidence of active proctitis but whose colon is otherwise normal with no evidence of inflammation.  She is on Entyvio every 6 weeks, mesalamine 1.5 g a day and mesalamine suppositories at bedtime.\par \par Prior to the patient's next Entyvio infusion, bloodwork will be sent for CBC, Chem-matthias, TSH, ESR, C-reactive protein, iron studies, B12, folate, vitamin D, Entyvio levels and antibodies, hepatitis B and C serologies, quantiferon gold.\par \par Patient will continue Entyvio and oral mesalamine.  I will change the mesalamine suppositories to hydrocortisone suppositories twice daily to see if this provides better relief.\par \par Patient was given Proctozone 2.5% cream to use as needed for the hemorrhoids.\par \par We will repeat a colonoscopy in 1 year that is March 2024.\par \par \par Plan from 11/17/2022 - Patient with Crohn's disease who is on Entyvio infusions every 6 weeks, form of mesalamine 4 pills a day, and mesalamine suppositories at bedtime.  She is doing well although she gets increased frequency of solid stools once a week with rectal spasm.\par \par Bloodwork was sent for CBC, Chem-matthias, TSH, ESR, C-reactive protein, iron studies, B12, folate, vitamin D.\par \par Patient will continue her current medications.\par \par Patient is due for colonoscopy in March 2023.\par \par \par Plan from 6/6/2022 - Patient with Crohn's disease who is doing well on Entyvio infusions every 6 weeks along with oral mesalamine.  She uses mesalamine suppositories about 3-4 times a week.  Colonoscopy was normal other than active disease in the rectum.\par \par I advised the patient to continue her current medications but to increase the suppository to take it daily.\par \par Bloodwork was sent for CBC, Chem-matthias, TSH, ESR, C-reactive protein, iron studies, B12, folate, vitamin D.\par \par Patient was advised to increase fiber in her diet and to drink at least 64 ounces of water a day.\par \par We will repeat a colonoscopy in March 2023.\par \par Patient will return to see me in 4 months.\par \par \par Plan from 1/24/2022 - Patient with Crohn's disease who is doing well on Entyvio infusions which were increased to every 6 weeks along with oral mesalamine and mesalamine suppositories.  When the oral formulation was changed and the suppositories were stopped and changed to enemas by the insurance company, the patient started feeling worse.  She was unable to tolerate the enemas.\par \par We will try to get the mesalamine suppositories covered.\par \par Bloodwork was sent for CBC, Chem-matthias, TSH, ESR, C-reactive protein, iron studies, B12, folate, vitamin D, hepatitis B and C serologies, quantiferon gold, Entyvio levels (as the patient is having her next infusion tomorrow).\par \par Stool will be sent for fecal calprotectin.\par \par A colonoscopy has been scheduled. The risks, benefits, alternatives, and limitations of the procedure, including the possibility of missed lesions, were explained.\par \par \par Plan from 7/19/2021 - Patient with Crohn's disease who is on Entyvio, oral mesalamine, and rectal mesalamine suppositories.  She is doing well symptomatically.  She did have elevated markers of inflammation on her most recent blood work.\par \par We will continue with current medications.\par \par Bloodwork was sent for CBC, Chem-matthias, TSH, ESR, C-reactive protein, iron studies, B12, folate, vitamin D.\par \par Stool will be sent for calprotectin.\par \par Patient is due for colonoscopy in February 2022.\par \par \par Plan from 3/23/2021 - Patient with Crohn's disease which is under good control except for in the rectum where she has active proctitis.  She is on Entyvio, oral mesalamine, and now mesalamine suppositories.  Symptomatically, the patient is improved.\par \par Patient will continue the current treatment.\par \par Bloodwork was sent for CBC, Chem-matthias, TSH, ESR, C-reactive protein, iron studies, B12, folate, vitamin D.\par \par Patient will have her next colonoscopy in 1 year that is February 2022.\par \par Patient will return to see me in 4 months.\par \par \par Plan from 9/14/2020 - Patient with Crohn's disease on Entyvio and mesalamine suppositories.  She was doing well until the last 2 weeks, when she had developed left lower quadrant pain and has had intermittent episodes of tenesmus and mucus with her stools.  She reports tenderness on self-examination.  She has a history of diverticulitis as well.\par \par Patient was sent for a CT scan of the abdomen and pelvis to rule out diverticulitis or Crohn's related complication including abscess.\[facility] \[facility] Bloodwork will be sent for CBC, Chem-matthias, TSH, ESR, C-reactive protein, iron studies, B12, folate, vitamin D, QuantiFERON gold and Entyvio levels on the morning prior to her Entyvio infusion on September 24.\[facility] \par Patient is due for colonoscopy in February 2021.\[facility] \par Patient has low TSH and is being followed for this.\[facility] \par \par Plan from 5/15/2020 - Patient with Crohn's disease who is doing well on a combination of Entyvio and mesalamine suppositories.  She had active disease in her rectum which appears to be better controlled with the mesalamine.\par \par Patient will continue Entyvio and mesalamine suppositories.\[facility] \[facility] I counseled the patient regarding specific concerns of COVID-19 as the patient is on Entyvio.  She was advised of the importance of strictly following guidelines and limiting exposure as she is at increased risk for acquiring the infection and at increased risk for having a more complicated course.\[facility] \[facility] Bloodwork will be sent for CBC, Chem-matthias, TSH, ESR, C-reactive protein, iron studies, B12, folate, vitamin D.  This will be performed at the time of her next Entyvio infusion on June 4.\par \par Patient is due for colonoscopy in February 2021.\par \par Patient will return to see me in 3 months.\par \par \par Plan from 2/27/2020 - Patient with Crohn's disease who now has well-controlled disease on colonoscopy other than active disease in the rectum.  This is consistent with her symptoms of rectal spasm.  Her Entyvio levels are a bit low with negative antibody formation. \par \par I have added Canasa suppository 1000 mg nightly.  I also advised the patient to use Citrucel daily to try to make her bowel movements more regular.\par \par We will keep the Entyvio infusions at the current frequency.  If the patient's symptoms worsen, we can consider increasing the frequency of infusions based on Entyvio levels.\par \par Patient will continue to follow-up with her endocrinologist for her diabetes and hypothyroidism.\par \par We will repeat a colonoscopy in 1 year that is February 2021.\par \par \par Plan from 1/2/2020  - Patient with Crohn's colitis who is now on Entyvio infusions.  She is doing well clinically.\par \par Bloodwork was sent for CBC, Chem-matthias, TSH, ESR, C-reactive protein, iron studies, B12, folate, vitamin D.\par \par Patient will go for Entyvio levels and antibody blood testing prior to her next infusion in February.\par \par A colonoscopy has been scheduled. The risks, benefits, alternatives, and limitations of the procedure, including the possibility of missed lesions, were explained.  The patient will require anesthesia evaluation given her BMI of 40.64.\par \par \par Plan from 5/1/2019 - Patient with Crohn's colitis who has developed antibodies to Humira with undetectable levels. Hand in hand with this, the patient's inflammatory markers are rising. She is doing relatively well clinically although she has fecal urgency.\par \par I had a long discussion with the patient regarding other options. She definitely needs to switch to a different biologic. We agreed to pursue Entyvio infusions. I discussed potential risks.\par \par We will begin the insurance verification process and get the patient connected with an infusion center.\par \par Blood work was sent for quantiferrin gold and hepatitis B and C. serologies.\par \par The patient has started vitamin D 3 2000 international units q.d.\par \par Patient has an appointment with her endocrinologist regarding the very low TSH.\par \par Patient is due for colonoscopy in July.\par \par \par Plan from 4/10/19 - Patient with Crohn's colitis on Humira.\par \par We will draw labs at trough levels of Humira in one and a half weeks. These will include Humira levels and antibodies, CBC, Chem-matthias, TSH, ESR, C-reactive protein, iron studies, B12, folate, vitamin D.\par \par I have renewed hydrocortisone cream for her hemorrhoids.\par \par Patient will return to see me in July. She is due for a colonoscopy at that time.\par \par \par Note from 11/19/18 - Patient with Crohn's colitis who has begun Humira. She is currently doing well.\par \par Patient will continue her current medications.\par \par Blood work was sent for CBC, chem pack, sedimentation rate, C-reactive protein, iron studies, B12, folate.\par \par Patient will return to see me in 3 months.\par \par \par Plan from 10/19/18 - Patient with Crohn's colitis with active inflammation on previous colonoscopy and elevated inflammatory markers on recent blood work. She has had a worsening of her symptoms. At her last visit, we had discussed the possibility of beginning a biologic treatment but she decided to hold off at that time. The patient reports that her gynecologist had concerns regarding impact of the Crohn's disease on gynecologic organs although the patient has no symptoms referable to the urinary tract or any symptoms of fecal output vaginally. The patient has left lower quadrant tenderness on exam.\par \par A long discussion with the patient regarding biologic use. We agreed to begin Humira after discussion regarding the possible risks. We have begun the process of establishing the patient with a specialty pharmacy and beginning insurance verification.\par \par Patient was sent for a CT scan of the abdomen and pelvis to rule out any fistula or abscess.\par \par \par Plan from 8/31/18 - Patient with evidence of Crohn's colitis on colonoscopy with the presence of granulomas on multiple biopsies. The patient is doing well clinically on Apriso bypass active inflammation throughout the colon and elevated inflammatory markers on blood work.\par \par I had a long detailed discussion with the patient regarding our options at this point. We could choose to continue Apriso and altered treatment only if her symptoms change or she showed evidence of a flareup. Alternatively, we can consider a biologic as the patient does have active inflammation.\par \par Blood work was sent for CBC, chem PAC, sedimentation rate, C-reactive protein, B12, folate, iron studies, hepatitis B. and C. serologies, quantiferrin gold.\par \par At the present time, the patient will continue Apriso.\par \par We will repeat a colonoscopy in one year that is July 2019.\par \par Patient will return to see me in 2 months.\par \par \par Plan from 6/18/18 - Patient with ulcerative colitis doing well clinically.\par \par A colonoscopy has been scheduled. The risks, benefits, alternatives, and limitations of the procedure, including the possibility of missed lesions, were explained.\par \par Blood work was sent for CBC, chem pack, sedimentation rate, C-reactive protein, iron studies, B12, folate.\par \par \par Plan from 2/2/18 - Patient doing better after a flareup of her ulcerative colitis. She did not require prednisone. She is now doing well on Apriso.\par \par Patient will continue Apriso 4 tablets a day.\par \par Blood work was sent for CBC, chem pack, sedimentation rate, C-reactive protein, iron studies, B12, folate.\par \par Patient will return to see me in 3 months. She is due for colonoscopy in June.\par \par \par Plan from 1/2/18 - Patient with a flareup of ulcerative colitis. She is starting to improve on Apriso. It is possible that the patient's hyperthyroidism is partially contributing to the diarrhea symptoms.\par \par Patient will continue Apriso for tablets a day.\par \par At this point, the patient was advised to stay off of prednisone.\par \par Patient was given hydrocortisone cream 2.5% to apply to hemorrhoids.\par \par Patient was advised to proceed with radioactive iodine to treat the hyperthyroidism.\par \par Patient will return to see me in one month. We will plan on a colonoscopy in June.\par \par \par Plan from 11/28/17 - Patient with probable ulcerative colitis who has symptoms of diarrhea with bleeding despite being on Lialda.  \par \par Stool studies will be sent for PCR testing, C. diff, lactoferrin, and calprotectin.\par \par Blood work was sent for CBC, sedimentation rate, C-reactive protein, TFTs.\par \par If the above are consistent with ulcerative colitis, the patient will require a short course of steroids.\par \par \par Plan from 8/28/17 - Patient with a pancolitis on colonoscopy. We do not have a clear understanding of whether or not she has true inflammatory bowel disease. Her markers are negative.\par \par Patient will continue on Lialda 4.8 g a day.\par \par Patient will return to see me in one month.\par \par Colonoscopy in June 2018.\par \par \par Plan from 7/5/17 - Patient with pancolitis on colonoscopy. The biopsy findings along with the positive calprotectin and lactoferrin suggests the possibility of IBD. The patient had mild elevation of her LFTs but these have normalized.\par \par Blood work was sent for IBD serology, CBC, sedimentation rate, C-reactive protein.\par \par Patient was started on Lialda 4.8 g a day.\par \par We will repeat a colonoscopy in one year that is June 2018.\par \par Patient has been following with her endocrinologist regarding her hyperthyroidism.\par \par Patient will return to see me in one month.

## 2023-05-09 ENCOUNTER — NON-APPOINTMENT (OUTPATIENT)
Age: 63
End: 2023-05-09

## 2023-05-16 ENCOUNTER — APPOINTMENT (OUTPATIENT)
Dept: ORTHOPEDIC SURGERY | Facility: CLINIC | Age: 63
End: 2023-05-16
Payer: COMMERCIAL

## 2023-05-16 VITALS — WEIGHT: 235 LBS | BODY MASS INDEX: 37.77 KG/M2 | HEIGHT: 66 IN

## 2023-05-16 PROCEDURE — 20551 NJX 1 TENDON ORIGIN/INSJ: CPT

## 2023-05-16 PROCEDURE — 99214 OFFICE O/P EST MOD 30 MIN: CPT | Mod: 25

## 2023-05-16 PROCEDURE — 73110 X-RAY EXAM OF WRIST: CPT | Mod: LT

## 2023-05-16 NOTE — ASSESSMENT
[FreeTextEntry1] : Patient is an excellent candidate for facet block therapy. This is based on today's finding of sudden midline pain with spine extension. Patient also has a Hx of facet blocks approximately 4 years ago which were followed by a 4 month period of relief.\par \par Mechanical LBP of facet origin.

## 2023-05-16 NOTE — PROCEDURE
[FreeTextEntry3] : Tendon sheath was performed of the left de Quervain's. The indication for this procedure was pain, inflammation and repeat series performed. The site was prepped with alcohol, betadine, ethyl chloride sprayed topically and sterile technique used. An injection of Lidocaine 2cc of 1%, Methylprednisolon(Depomedrol) 1cc of 40 mg  was used. \par Patient tolerated procedure well. Patient was advised to call if redness, pain or fever occur and apply ice for 15 minutes out of every hour for the next 12-24 hours as tolerated. \par \par Patient has tried OTC's including aspirin, Ibuprofen, Aleve, etc or prescription NSAIDS, and/or exercises at home and/or physical therapy without satisfactory response and the risks benefits, and alternatives have been discussed, and verbal consent was obtained.

## 2023-05-16 NOTE — IMAGING
[de-identified] : Left Wrist Exam: There is tenderness just distal to the radial styloid over the first dorsal compartment. Full wrist ulnar deviation combined with flexion of the thumb joints produces a great pain response which is a positive Finkelstein test. \par \par Left Wrist X-Rays: Unremarkable with no STC and no DJD. \par \par Lumbar Spine Exam: The para lumbar muscle tone is greater than average. Lumbar flexion range is 70 degrees, and the lordosis normally reverses. Extension is immediately painful over the lumbar midline, and is limited to less than 5 degrees. RT lateral flexion produces RT-sided LBP and is half of full. LT lateral flexion produces a mild pain response, with a greater range than RT lateral flexion. Patient is able to heel walk and toe walk well. SLR is negative bilaterally. DTR of the knees are 2+ equal. DTR of the ankles are 1+ equal.\par \par

## 2023-05-16 NOTE — DISCUSSION/SUMMARY
[de-identified] : 1) I discussed the risks, benefits and alternatives of treatment options for the left wrist, including activity modification, rest, home exercise, oral antiinflammatory medication, brace, CSI. \par 2) **Tendon Sheath CSI performed to left first dorsal compartment of the wrist.\par 3) Pt will continue with activity modification and home exercise as tolerated.  The patient should avoid exercise and activity that aggravates pain.  \par 4) ** Renewed Rx PT for core strengthening.\par \par \par The patient will continue with conservative treatment as described above, and will F/U in 6 weeks. \par \par \par The patient was advised of the diagnosis.  The natural history of the pathology was explained in full to the patient in layman's terms, including but not limited to the risks, symptoms and available options for treatment.  We discussed the risks, benefits and alternatives of the treatment options and the advice I provided to the patient as listed above.  Pt was given the opportunity to ask questions, and all questions were answered.  The discussion was not limited to the above.\par \par Entered by Keyla Collado acting as scribe.

## 2023-05-16 NOTE — HISTORY OF PRESENT ILLNESS
[de-identified] : 11/09/2022: Lower Back Evaluation\par 61 year old female, presents today for a 30 year Hx of LBP. Pt reports that she suffered a fall in early October 2021 that worsened her LBP. Pt was previously treated by Dr. Woodruff and had X-rays of the lumbar spine (2 views) on 8/23/22. Pt reports Hx of L4-L5 disc herniations. Pt reports that 12 previous PT sessions provided good strengthening exercises, but then her insurance changed. Pt reports that her pain can very in intensity, and can change from one side of the lower back to another intermittently, only affecting one side at a time. Sudden midline pain with spine extension. Pt reports that her pain is worst in the morning, and can be accompanied by spasms. Pt denies any radicular symptoms and/or N/T. Patient also has a Hx of facet blocks approximately 4 years ago which were followed by a 4 month period of relief. RF ablation had not been performed. Pt reports she had a 15lb weight gain since last year.\par \par Pt has a Hx of diabetes. \par \par 05/16/2023: Left Wrist\par One month history of pain over the dorsal aspect of the left wrist which she believes may be due to the position of the wrist she holds her phone in while lying down. This position involves almost full wrist flexion. She gets partial relief wearing an elastic wrist support with the thumb free and has not had a similar problem in the past.\par \par **Tendon Sheath CSI performed to left first dorsal compartment of the wrist** [FreeTextEntry1] : Left Wrist, Back & Left Knee [FreeTextEntry5] : Pt states she has pain in the left wrist, the lower back, in the middle and the left knee.  Pt states she has pain in the wrist when she does not wear her left wrist brace.  Pt states her lower back hurts in the middle of the night and when she wakes up in the morning.  Mostly when she is laying down.  Pt states she feels the worst left knee pain when she goes up the stairs.

## 2023-06-27 ENCOUNTER — APPOINTMENT (OUTPATIENT)
Dept: ORTHOPEDIC SURGERY | Facility: CLINIC | Age: 63
End: 2023-06-27
Payer: COMMERCIAL

## 2023-06-27 DIAGNOSIS — M65.4 RADIAL STYLOID TENOSYNOVITIS [DE QUERVAIN]: ICD-10-CM

## 2023-06-27 PROCEDURE — 99214 OFFICE O/P EST MOD 30 MIN: CPT

## 2023-06-27 NOTE — IMAGING
[de-identified] : Left Wrist Exam: There is tenderness just distal to the radial styloid over the first dorsal compartment. Full wrist ulnar deviation combined with flexion of the thumb joints produces a great pain response which is a positive Finkelstein test. \par \par Lumbar Spine Exam: The para lumbar muscle tone is greater than average. Lumbar flexion range is 70 degrees, and the lordosis normally reverses. Extension is immediately painful over the lumbar midline, and is limited to less than 5 degrees. RT lateral flexion produces RT-sided LBP and is half of full. LT lateral flexion produces a mild pain response, with a greater range than RT lateral flexion. Patient is able to heel walk and toe walk well. SLR is negative bilaterally. DTR of the knees are 2+ equal. DTR of the ankles are 1+ equal.\par \par Left Knee Exam: ROM is from a trace flexion contracture to at least 120 degrees of flexion. There is no laxity. There is tenderness over the enthesis on the tibial tubercle. No enthesopathy at the inferior patella. There is no patellar tendon tenderness. There is no retinacular tenderness. \par \par Left Wrist X-Rays: Unremarkable with no STC and no DJD. \par \par X-ray of the LT knee (3 views) on 10/25/2022: The medial and lateral compartments show good cartilage width without any reactive change. Sunrise view shows significant narrowing of the lateral facet with early to moderate reaction, and no glide.\par \par \par \par

## 2023-06-27 NOTE — DISCUSSION/SUMMARY
[de-identified] : 1) I discussed the risks, benefits and alternatives of treatment options for the left wrist, including activity modification, rest, home exercise, oral antiinflammatory medication, brace, CSI. \par 2)  Pt will continue with activity modification and home exercise as tolerated.  The patient should avoid exercise and activity that aggravates pain.  \par 3) ** Renewed Rx PT for core strengthening and quad strengthening.\par \par \par The patient will continue with conservative treatment as described above, and will F/U in 6 weeks. \par \par \par The patient was advised of the diagnosis.  The natural history of the pathology was explained in full to the patient in layman's terms, including but not limited to the risks, symptoms and available options for treatment.  We discussed the risks, benefits and alternatives of the treatment options and the advice I provided to the patient as listed above.  Pt was given the opportunity to ask questions, and all questions were answered.  The discussion was not limited to the above.\par \par Entered by Keyla Collado acting as scribe.

## 2023-06-27 NOTE — HISTORY OF PRESENT ILLNESS
[de-identified] : 11/09/2022: Lower Back Evaluation\par 61 year old female, presents today for a 30 year Hx of LBP. Pt reports that she suffered a fall in early October 2021 that worsened her LBP. Pt was previously treated by Dr. Woodruff and had X-rays of the lumbar spine (2 views) on 8/23/22. Pt reports Hx of L4-L5 disc herniations. Pt reports that 12 previous PT sessions provided good strengthening exercises, but then her insurance changed. Pt reports that her pain can very in intensity, and can change from one side of the lower back to another intermittently, only affecting one side at a time. Sudden midline pain with spine extension. Pt reports that her pain is worst in the morning, and can be accompanied by spasms. Pt denies any radicular symptoms and/or N/T. Patient also has a Hx of facet blocks approximately 4 years ago which were followed by a 4 month period of relief. RF ablation had not been performed. Pt reports she had a 15lb weight gain since last year.\par \par Pt has a Hx of diabetes. \par \par 05/16/2023: Left Wrist\par One month history of pain over the dorsal aspect of the left wrist which she believes may be due to the position of the wrist she holds her phone in while lying down. This position involves almost full wrist flexion. She gets partial relief wearing an elastic wrist support with the thumb free and has not had a similar problem in the past.\par \par **Tendon Sheath CSI performed to left first dorsal compartment of the wrist**\par \par 06/27/2023: Lower Back, Left Wrist\par Pt reports her left wrist is much better since the last visit since receiving the CSI, while her back has made a little progress. She is continuing to attend physical therapy, and reports that she is experiencing pain in her left knee when ascending stairs.  [FreeTextEntry1] : Left Wrist, Back & Left Knee [FreeTextEntry5] :  Pt states her left wrist is much better than the last visit.  Pt states little improvement with her back.  Pt states she is continuing with her physical therapy for her back.  Pt states her left knee is continuing to bother her and she has difficulty going up the stairs because she has to put weight on her left knee.

## 2023-08-08 ENCOUNTER — APPOINTMENT (OUTPATIENT)
Dept: ORTHOPEDIC SURGERY | Facility: CLINIC | Age: 63
End: 2023-08-08
Payer: COMMERCIAL

## 2023-08-08 VITALS — WEIGHT: 235 LBS | BODY MASS INDEX: 37.77 KG/M2 | HEIGHT: 66 IN

## 2023-08-08 PROCEDURE — 99214 OFFICE O/P EST MOD 30 MIN: CPT

## 2023-08-08 NOTE — HISTORY OF PRESENT ILLNESS
[de-identified] : 11/09/2022: Lower Back Evaluation 61 year old female, presents today for a 30 year Hx of LBP. Pt reports that she suffered a fall in early October 2021 that worsened her LBP. Pt was previously treated by Dr. Woodruff and had X-rays of the lumbar spine (2 views) on 8/23/22. Pt reports Hx of L4-L5 disc herniations. Pt reports that 12 previous PT sessions provided good strengthening exercises, but then her insurance changed. Pt reports that her pain can very in intensity, and can change from one side of the lower back to another intermittently, only affecting one side at a time. Sudden midline pain with spine extension. Pt reports that her pain is worst in the morning, and can be accompanied by spasms. Pt denies any radicular symptoms and/or N/T. Patient also has a Hx of facet blocks approximately 4 years ago which were followed by a 4 month period of relief. RF ablation had not been performed. Pt reports she had a 15lb weight gain since last year.  Pt has a Hx of diabetes.   05/16/2023: Left Wrist One month history of pain over the dorsal aspect of the left wrist which she believes may be due to the position of the wrist she holds her phone in while lying down. This position involves almost full wrist flexion. She gets partial relief wearing an elastic wrist support with the thumb free and has not had a similar problem in the past.  **Tendon Sheath CSI performed to left first dorsal compartment of the wrist**  06/27/2023: Lower Back, Left Wrist Pt reports her left wrist is much better since the last visit since receiving the CSI, while her back has made a little progress. She is continuing to attend physical therapy, and reports that she is experiencing pain in her left knee when ascending stairs.   08/08/2023: Lower Back, Left Wrist The left wrist problem is resolved after first dorsal compartment injection. The left side of her back is significantly improved with physical therapy. However, the same pain has now appeared on the right side with the same quality and location but less intensity. She has a past history of approximately 7 years ago of excellent response to facet block. The symptoms returned in 3-4 months and she then did not respond to facet block. She presently is undergoing left quad strengthening program at physical therapy, with some improvement.  [FreeTextEntry1] : Left Wrist, Back & Left Knee [FreeTextEntry5] : Pt states that she is still going to PT for her left knee and it is helping, the left side of her back has improved but the right side has worsened. Pt reports that she has no complaints for her left wrist.

## 2023-08-08 NOTE — DISCUSSION/SUMMARY
[de-identified] : 1) I discussed the risks, benefits and alternatives of treatment options for the left wrist, including activity modification, rest, home exercise, oral antiinflammatory medication, brace, CSI.  2)  Pt will continue with activity modification and home exercise as tolerated.  The patient should avoid exercise and activity that aggravates pain.   3) ** Renewed Rx PT for core strengthening and quad strengthening. 4) ** Obtain MRI of the Lumbar spine in anticipation of Pain Management treatment.    The patient will continue with conservative treatment as described above, and will F/U after MRI.    The patient was advised of the diagnosis.  The natural history of the pathology was explained in full to the patient in layman's terms, including but not limited to the risks, symptoms and available options for treatment.  We discussed the risks, benefits and alternatives of the treatment options and the advice I provided to the patient as listed above.  Pt was given the opportunity to ask questions, and all questions were answered.  The discussion was not limited to the above.  Entered by Keyla Collado acting as scribe.

## 2023-08-08 NOTE — IMAGING
[de-identified] : Left Wrist Exam: There is tenderness just distal to the radial styloid over the first dorsal compartment. Full wrist ulnar deviation combined with flexion of the thumb joints produces a great pain response which is a positive Finkelstein test.   Lumbar Spine Exam: Stance is normal. Flexion is full and well tolerated. Extension produces moderate pain in the new pain region which is just right of the midline at approximately L5-S1. Lateral flexion left immediately and greatly aggravates the pain. Lateral flexion right is well tolerated.   Left Knee Exam: ROM is from a trace flexion contracture to at least 120 degrees of flexion. There is no laxity. There is tenderness over the enthesis on the tibial tubercle. No enthesopathy at the inferior patella. There is no patellar tendon tenderness. There is no retinacular tenderness.   Left Wrist X-Rays: Unremarkable with no STC and no DJD.   X-ray of the LT knee (3 views) on 10/25/2022: The medial and lateral compartments show good cartilage width without any reactive change. Sunrise view shows significant narrowing of the lateral facet with early to moderate reaction, and no glide.

## 2023-08-13 ENCOUNTER — RX RENEWAL (OUTPATIENT)
Age: 63
End: 2023-08-13

## 2023-08-21 ENCOUNTER — APPOINTMENT (OUTPATIENT)
Dept: MRI IMAGING | Facility: CLINIC | Age: 63
End: 2023-08-21
Payer: COMMERCIAL

## 2023-08-21 PROCEDURE — 72148 MRI LUMBAR SPINE W/O DYE: CPT

## 2023-08-29 ENCOUNTER — APPOINTMENT (OUTPATIENT)
Dept: ORTHOPEDIC SURGERY | Facility: CLINIC | Age: 63
End: 2023-08-29
Payer: COMMERCIAL

## 2023-08-29 VITALS — HEIGHT: 66 IN | WEIGHT: 235 LBS | BODY MASS INDEX: 37.77 KG/M2

## 2023-08-29 DIAGNOSIS — M17.12 UNILATERAL PRIMARY OSTEOARTHRITIS, LEFT KNEE: ICD-10-CM

## 2023-08-29 DIAGNOSIS — M54.50 LOW BACK PAIN, UNSPECIFIED: ICD-10-CM

## 2023-08-29 DIAGNOSIS — G89.29 LOW BACK PAIN, UNSPECIFIED: ICD-10-CM

## 2023-08-29 DIAGNOSIS — M47.816 SPONDYLOSIS W/OUT MYELOPATHY OR RADICULOPATHY, LUMBAR REGION: ICD-10-CM

## 2023-08-29 PROCEDURE — 99214 OFFICE O/P EST MOD 30 MIN: CPT

## 2023-08-31 NOTE — IMAGING
[de-identified] : Left Wrist Exam: There is tenderness just distal to the radial styloid over the first dorsal compartment. Full wrist ulnar deviation combined with flexion of the thumb joints produces a great pain response which is a positive Finkelstein test.   Lumbar Spine Exam: Stance is normal. Flexion is full and well tolerated. Extension produces moderate pain in the new pain region which is just right of the midline at approximately L5-S1. Lateral flexion left immediately and greatly aggravates the pain. Lateral flexion right is well tolerated.   Left Knee Exam: ROM is from a trace flexion contracture to at least 120 degrees of flexion. There is no laxity. There is tenderness over the enthesis on the tibial tubercle. No enthesopathy at the inferior patella. There is no patellar tendon tenderness. There is no retinacular tenderness.   Left Wrist X-Rays: Unremarkable with no STC and no DJD.   X-ray of the LT knee (3 views) on 10/25/2022: The medial and lateral compartments show good cartilage width without any reactive change. Sunrise view shows significant narrowing of the lateral facet with early to moderate reaction, and no glide.

## 2023-08-31 NOTE — DISCUSSION/SUMMARY
[de-identified] : 1) I reviewed and discussed the Lumbar spine MRI with the patient.  2)  Pt will continue with activity modification and home exercise as tolerated.  The patient should avoid exercise and activity that aggravates pain.   3) ** Continue Rx PT for core strengthening and quad strengthening. 4) I highly recommend that the patient consult with Pain Management for treatment of facet joint pain with injection and eventually RFA.   The patient will continue with conservative treatment as described above, and will F/U PRN.    The patient was advised of the diagnosis.  The natural history of the pathology was explained in full to the patient in layman's terms, including but not limited to the risks, symptoms and available options for treatment.  We discussed the risks, benefits and alternatives of the treatment options and the advice I provided to the patient as listed above.  Pt was given the opportunity to ask questions, and all questions were answered.  The discussion was not limited to the above.  Entered by Keyla Collado acting as scribe.

## 2023-08-31 NOTE — HISTORY OF PRESENT ILLNESS
[de-identified] : 11/09/2022: Lower Back Evaluation 61 year old female, presents today for a 30 year Hx of LBP. Pt reports that she suffered a fall in early October 2021 that worsened her LBP. Pt was previously treated by Dr. Woodruff and had X-rays of the lumbar spine (2 views) on 8/23/22. Pt reports Hx of L4-L5 disc herniations. Pt reports that 12 previous PT sessions provided good strengthening exercises, but then her insurance changed. Pt reports that her pain can very in intensity, and can change from one side of the lower back to another intermittently, only affecting one side at a time. Sudden midline pain with spine extension. Pt reports that her pain is worst in the morning, and can be accompanied by spasms. Pt denies any radicular symptoms and/or N/T. Patient also has a Hx of facet blocks approximately 4 years ago which were followed by a 4 month period of relief. RF ablation had not been performed. Pt reports she had a 15lb weight gain since last year.  Pt has a Hx of diabetes.   05/16/2023: Left Wrist One month history of pain over the dorsal aspect of the left wrist which she believes may be due to the position of the wrist she holds her phone in while lying down. This position involves almost full wrist flexion. She gets partial relief wearing an elastic wrist support with the thumb free and has not had a similar problem in the past.  **Tendon Sheath CSI performed to left first dorsal compartment of the wrist**  06/27/2023: Lower Back, Left Wrist Pt reports her left wrist is much better since the last visit since receiving the CSI, while her back has made a little progress. She is continuing to attend physical therapy, and reports that she is experiencing pain in her left knee when ascending stairs.   08/08/2023: Lower Back, Left Wrist The left wrist problem is resolved after first dorsal compartment injection. The left side of her back is significantly improved with physical therapy. However, the same pain has now appeared on the right side with the same quality and location but less intensity. She has a past history of approximately 7 years ago of excellent response to facet block. The symptoms returned in 3-4 months and she then did not respond to facet block. She presently is undergoing left quad strengthening program at physical therapy, with some improvement.   08/29/2023: Lumbar Spine Pt is here for a follow up today for Lumbar spine MRI results. She states that her back is slightly improved since the last visit, but notes that the pain is still present.  **Lumbar Spine MRI results taken on 08/21/23** IMPRESSION: 1. Multilevel lumbar degenerative disc disease. 2. Disc herniations at L2-3 and L4-5 with a disc bulge at L3-4 without stenosis or nerve root compression. 3. Central disc herniation at L5-S1 with minimal compression of the anterior aspect of the thecal sac. 4. Right-sides facet osteoarthrosis at L4-5 and to a lesser extent L5-S1. [FreeTextEntry1] : Left Wrist, Back & Left Knee [FreeTextEntry5] : Pt states that she is still going to PT for her left knee and it is helping, the left side of her back has improved but the right side has worsened. Pt reports that she has no complaints for her left wrist.

## 2023-09-22 ENCOUNTER — APPOINTMENT (OUTPATIENT)
Dept: PAIN MANAGEMENT | Facility: CLINIC | Age: 63
End: 2023-09-22

## 2023-11-03 ENCOUNTER — APPOINTMENT (OUTPATIENT)
Dept: GASTROENTEROLOGY | Facility: CLINIC | Age: 63
End: 2023-11-03
Payer: COMMERCIAL

## 2023-11-03 VITALS
WEIGHT: 234 LBS | HEART RATE: 67 BPM | SYSTOLIC BLOOD PRESSURE: 112 MMHG | OXYGEN SATURATION: 97 % | DIASTOLIC BLOOD PRESSURE: 60 MMHG | HEIGHT: 66 IN | BODY MASS INDEX: 37.61 KG/M2 | TEMPERATURE: 97.3 F

## 2023-11-03 DIAGNOSIS — K62.5 HEMORRHAGE OF ANUS AND RECTUM: ICD-10-CM

## 2023-11-03 PROCEDURE — 99214 OFFICE O/P EST MOD 30 MIN: CPT | Mod: 25

## 2023-11-03 RX ORDER — SODIUM PICOSULFATE, MAGNESIUM OXIDE, AND ANHYDROUS CITRIC ACID 12; 3.5; 1 G/175ML; G/175ML; MG/175ML
10-3.5-12 MG-GM LIQUID ORAL
Qty: 2 | Refills: 0 | Status: ACTIVE | COMMUNITY
Start: 2023-11-03 | End: 1900-01-01

## 2023-11-03 RX ORDER — SODIUM SULFATE, POTASSIUM SULFATE AND MAGNESIUM SULFATE 1.6; 3.13; 17.5 G/177ML; G/177ML; G/177ML
17.5-3.13-1.6 SOLUTION ORAL
Qty: 1 | Refills: 0 | Status: DISCONTINUED | COMMUNITY
Start: 2022-11-17 | End: 2023-11-03

## 2023-11-04 LAB
25(OH)D3 SERPL-MCNC: 29.5 NG/ML
ALBUMIN SERPL ELPH-MCNC: 4.3 G/DL
ALP BLD-CCNC: 86 U/L
ALT SERPL-CCNC: 15 U/L
ANION GAP SERPL CALC-SCNC: 16 MMOL/L
AST SERPL-CCNC: 19 U/L
BASOPHILS # BLD AUTO: 0.07 K/UL
BASOPHILS NFR BLD AUTO: 0.6 %
BILIRUB SERPL-MCNC: 0.2 MG/DL
BUN SERPL-MCNC: 8 MG/DL
CALCIUM SERPL-MCNC: 10 MG/DL
CHLORIDE SERPL-SCNC: 103 MMOL/L
CO2 SERPL-SCNC: 27 MMOL/L
CREAT SERPL-MCNC: 0.81 MG/DL
CRP SERPL-MCNC: 11 MG/L
EGFR: 83 ML/MIN/1.73M2
EOSINOPHIL # BLD AUTO: 0.42 K/UL
EOSINOPHIL NFR BLD AUTO: 3.8 %
ERYTHROCYTE [SEDIMENTATION RATE] IN BLOOD BY WESTERGREN METHOD: 41 MM/HR
FERRITIN SERPL-MCNC: 129 NG/ML
FOLATE SERPL-MCNC: >20 NG/ML
GLUCOSE SERPL-MCNC: 116 MG/DL
HCT VFR BLD CALC: 46 %
HGB BLD-MCNC: 14.3 G/DL
IMM GRANULOCYTES NFR BLD AUTO: 0.4 %
IRON SATN MFR SERPL: 18 %
IRON SERPL-MCNC: 61 UG/DL
LYMPHOCYTES # BLD AUTO: 2.82 K/UL
LYMPHOCYTES NFR BLD AUTO: 25.2 %
MAN DIFF?: NORMAL
MCHC RBC-ENTMCNC: 28.3 PG
MCHC RBC-ENTMCNC: 31.1 GM/DL
MCV RBC AUTO: 90.9 FL
MONOCYTES # BLD AUTO: 0.75 K/UL
MONOCYTES NFR BLD AUTO: 6.7 %
NEUTROPHILS # BLD AUTO: 7.07 K/UL
NEUTROPHILS NFR BLD AUTO: 63.3 %
PLATELET # BLD AUTO: 312 K/UL
POTASSIUM SERPL-SCNC: 4.2 MMOL/L
PROT SERPL-MCNC: 7.2 G/DL
RBC # BLD: 5.06 M/UL
RBC # FLD: 14.2 %
SODIUM SERPL-SCNC: 146 MMOL/L
TIBC SERPL-MCNC: 332 UG/DL
TSH SERPL-ACNC: 2.04 UIU/ML
UIBC SERPL-MCNC: 271 UG/DL
VIT B12 SERPL-MCNC: 558 PG/ML
WBC # FLD AUTO: 11.17 K/UL

## 2023-12-07 RX ORDER — MESALAMINE 1000 MG/1
1000 SUPPOSITORY RECTAL
Qty: 90 | Refills: 3 | Status: ACTIVE | COMMUNITY
Start: 2022-01-24 | End: 1900-01-01

## 2024-03-04 ENCOUNTER — APPOINTMENT (OUTPATIENT)
Dept: GASTROENTEROLOGY | Facility: AMBULATORY MEDICAL SERVICES | Age: 64
End: 2024-03-04
Payer: COMMERCIAL

## 2024-03-04 PROCEDURE — 45380 COLONOSCOPY AND BIOPSY: CPT

## 2024-04-10 ENCOUNTER — RX RENEWAL (OUTPATIENT)
Age: 64
End: 2024-04-10

## 2024-04-10 RX ORDER — MESALAMINE 0.38 G/1
0.38 CAPSULE, EXTENDED RELEASE ORAL
Qty: 360 | Refills: 2 | Status: ACTIVE | COMMUNITY
Start: 2022-03-23 | End: 1900-01-01

## 2024-05-02 ENCOUNTER — LABORATORY RESULT (OUTPATIENT)
Age: 64
End: 2024-05-02

## 2024-05-02 ENCOUNTER — APPOINTMENT (OUTPATIENT)
Dept: GASTROENTEROLOGY | Facility: CLINIC | Age: 64
End: 2024-05-02
Payer: COMMERCIAL

## 2024-05-02 VITALS
WEIGHT: 227 LBS | HEIGHT: 66 IN | DIASTOLIC BLOOD PRESSURE: 70 MMHG | TEMPERATURE: 96.4 F | OXYGEN SATURATION: 97 % | BODY MASS INDEX: 36.48 KG/M2 | SYSTOLIC BLOOD PRESSURE: 130 MMHG | HEART RATE: 67 BPM

## 2024-05-02 DIAGNOSIS — K50.10 CROHN'S DISEASE OF LARGE INTESTINE W/OUT COMPLICATIONS: ICD-10-CM

## 2024-05-02 PROCEDURE — 99215 OFFICE O/P EST HI 40 MIN: CPT

## 2024-05-02 RX ORDER — SEMAGLUTIDE 2.68 MG/ML
8 INJECTION, SOLUTION SUBCUTANEOUS
Refills: 0 | Status: ACTIVE | COMMUNITY

## 2024-05-02 RX ORDER — METOCLOPRAMIDE 10 MG/1
10 TABLET ORAL
Qty: 1 | Refills: 0 | Status: ACTIVE | COMMUNITY
Start: 2024-05-02 | End: 1900-01-01

## 2024-05-02 RX ORDER — TRAZODONE HYDROCHLORIDE 100 MG/1
100 TABLET ORAL
Refills: 0 | Status: ACTIVE | COMMUNITY

## 2024-05-02 NOTE — PHYSICAL EXAM
[General Appearance - Alert] : alert [General Appearance - In No Acute Distress] : in no acute distress [Neck Appearance] : the appearance of the neck was normal [Neck Cervical Mass (___cm)] : no neck mass was observed [Jugular Venous Distention Increased] : there was no jugular-venous distention [Thyroid Diffuse Enlargement] : the thyroid was not enlarged [Thyroid Nodule] : there were no palpable thyroid nodules [Auscultation Breath Sounds / Voice Sounds] : lungs were clear to auscultation bilaterally [Heart Rate And Rhythm] : heart rate was normal and rhythm regular [Heart Sounds] : normal S1 and S2 [Heart Sounds Gallop] : no gallops [Murmurs] : no murmurs [Heart Sounds Pericardial Friction Rub] : no pericardial rub [Edema] : there was no peripheral edema [Bowel Sounds] : normal bowel sounds [Abdomen Soft] : soft [Abdomen Tenderness] : non-tender [] : no hepato-splenomegaly [Abdomen Mass (___ Cm)] : no abdominal mass palpated [No CVA Tenderness] : no ~M costovertebral angle tenderness [No Spinal Tenderness] : no spinal tenderness [Impaired Insight] : insight and judgment were intact [Oriented To Time, Place, And Person] : oriented to person, place, and time [Affect] : the affect was normal

## 2024-05-04 NOTE — ASSESSMENT
[FreeTextEntry1] : Patient with Crohn's disease who is on Entyvio infusions every 6 weeks, mesalamine 1.5 g a day, and mesalamine suppositories at bedtime.  She continues to have active proctitis on colonoscopy.  She previously developed resistance to Humira.  I had a long discussion with the patient regarding the treatment of her Crohn's disease.  We agreed to stop Entyvio and begin Skyrizi.  We will begin the process of setting up the initial IV infusions at weeks 0, 4, and 8 followed by subcutaneous injections beginning at week 12 and continuing every 8 weeks.  Bloodwork was sent for CBC, Chem-matthias, TSH, ESR, C-reactive protein, iron studies, B12, folate, vitamin D, hepatitis B and C serologies, quantiferon gold.  We will evaluate the rest of the small bowel to see if there is any other evidence of active Crohn's disease.  A capsule endoscopy has been scheduled. The risks, benefits, alternatives, and limitations of the procedure were explained.   Plan from 11/3/2023 - Patient with Crohn's disease who has had active proctitis.  She is on Entyvio infusions every 6 weeks, mesalamine 1.5 g a day, and mesalamine suppositories at bedtime.  She has constipation with straining.  She sees mucus and occasional bright red blood on the toilet paper.  Bloodwork was sent for CBC, Chem-matthias, TSH, ESR, C-reactive protein, iron studies, B12, folate, vitamin D.  We will plan on colonoscopy in March.  A colonoscopy has been scheduled. The risks, benefits, alternatives, and limitations of the procedure, including the possibility of missed lesions, were explained.  The patient will hold her dose of Ozempic immediately preceding the procedure.  The patient was advised to use Citrucel powder in 8 ounces of water once a day.  If there is ongoing active inflammation seen on colonoscopy, we will consider changing the patient to a different biologic.   Plan from 4/19/2023 - Patient with Crohn's disease who has evidence of active proctitis but whose colon is otherwise normal with no evidence of inflammation. She is on Entyvio every 6 weeks, mesalamine 1.5 g a day and mesalamine suppositories at bedtime.    Prior to the patient's next Entyvio infusion, bloodwork will be sent for CBC, Chem-matthias, TSH, ESR, C-reactive protein, iron studies, B12, folate, vitamin D, Entyvio levels and antibodies, hepatitis B and C serologies, quantiferon gold.    Patient will continue Entyvio and oral mesalamine. I will change the mesalamine suppositories to hydrocortisone suppositories twice daily to see if this provides better relief.    Patient was given Proctozone 2.5% cream to use as needed for the hemorrhoids.    We will repeat a colonoscopy in 1 year that is March 2024.      Plan from 11/17/2022 - Patient with Crohn's disease who is on Entyvio infusions every 6 weeks, form of mesalamine 4 pills a day, and mesalamine suppositories at bedtime. She is doing well although she gets increased frequency of solid stools once a week with rectal spasm.    Bloodwork was sent for CBC, Chem-matthias, TSH, ESR, C-reactive protein, iron studies, B12, folate, vitamin D.    Patient will continue her current medications.    Patient is due for colonoscopy in March 2023.      Plan from 6/6/2022 - Patient with Crohn's disease who is doing well on Entyvio infusions every 6 weeks along with oral mesalamine. She uses mesalamine suppositories about 3-4 times a week. Colonoscopy was normal other than active disease in the rectum.    I advised the patient to continue her current medications but to increase the suppository to take it daily.    Bloodwork was sent for CBC, Chem-matthias, TSH, ESR, C-reactive protein, iron studies, B12, folate, vitamin D.    Patient was advised to increase fiber in her diet and to drink at least 64 ounces of water a day.    We will repeat a colonoscopy in March 2023.    Patient will return to see me in 4 months.      Plan from 1/24/2022 - Patient with Crohn's disease who is doing well on Entyvio infusions which were increased to every 6 weeks along with oral mesalamine and mesalamine suppositories. When the oral formulation was changed and the suppositories were stopped and changed to enemas by the insurance company, the patient started feeling worse. She was unable to tolerate the enemas.    We will try to get the mesalamine suppositories covered.    Bloodwork was sent for CBC, Chem-matthias, TSH, ESR, C-reactive protein, iron studies, B12, folate, vitamin D, hepatitis B and C serologies, quantiferon gold, Entyvio levels (as the patient is having her next infusion tomorrow).    Stool will be sent for fecal calprotectin.    A colonoscopy has been scheduled. The risks, benefits, alternatives, and limitations of the procedure, including the possibility of missed lesions, were explained.      Plan from 7/19/2021 - Patient with Crohn's disease who is on Entyvio, oral mesalamine, and rectal mesalamine suppositories. She is doing well symptomatically. She did have elevated markers of inflammation on her most recent blood work.    We will continue with current medications.    Bloodwork was sent for CBC, Chem-matthias, TSH, ESR, C-reactive protein, iron studies, B12, folate, vitamin D.    Stool will be sent for calprotectin.    Patient is due for colonoscopy in February 2022.      Plan from 3/23/2021 - Patient with Crohn's disease which is under good control except for in the rectum where she has active proctitis. She is on Entyvio, oral mesalamine, and now mesalamine suppositories. Symptomatically, the patient is improved.    Patient will continue the current treatment.    Bloodwork was sent for CBC, Chem-matthias, TSH, ESR, C-reactive protein, iron studies, B12, folate, vitamin D.    Patient will have her next colonoscopy in 1 year that is February 2022.    Patient will return to see me in 4 months.      Plan from 9/14/2020 - Patient with Crohn's disease on Entyvio and mesalamine suppositories. She was doing well until the last 2 weeks, when she had developed left lower quadrant pain and has had intermittent episodes of tenesmus and mucus with her stools. She reports tenderness on self-examination. She has a history of diverticulitis as well.    Patient was sent for a CT scan of the abdomen and pelvis to rule out diverticulitis or Crohn's related complication including abscess.    Bloodwork will be sent for CBC, Chem-matthias, TSH, ESR, C-reactive protein, iron studies, B12, folate, vitamin D, QuantiFERON gold and Entyvio levels on the morning prior to her Entyvio infusion on September 24.    Patient is due for colonoscopy in February 2021.    Patient has low TSH and is being followed for this.      Plan from 5/15/2020 - Patient with Crohn's disease who is doing well on a combination of Entyvio and mesalamine suppositories. She had active disease in her rectum which appears to be better controlled with the mesalamine.    Patient will continue Entyvio and mesalamine suppositories.    I counseled the patient regarding specific concerns of COVID-19 as the patient is on Entyvio. She was advised of the importance of strictly following guidelines and limiting exposure as she is at increased risk for acquiring the infection and at increased risk for having a more complicated course.    Bloodwork will be sent for CBC, Chem-matthias, TSH, ESR, C-reactive protein, iron studies, B12, folate, vitamin D. This will be performed at the time of her next Entyvio infusion on June 4.    Patient is due for colonoscopy in February 2021.    Patient will return to see me in 3 months.      Plan from 2/27/2020 - Patient with Crohn's disease who now has well-controlled disease on colonoscopy other than active disease in the rectum. This is consistent with her symptoms of rectal spasm. Her Entyvio levels are a bit low with negative antibody formation.    I have added Canasa suppository 1000 mg nightly. I also advised the patient to use Citrucel daily to try to make her bowel movements more regular.    We will keep the Entyvio infusions at the current frequency. If the patient's symptoms worsen, we can consider increasing the frequency of infusions based on Entyvio levels.    Patient will continue to follow-up with her endocrinologist for her diabetes and hypothyroidism.    We will repeat a colonoscopy in 1 year that is February 2021.      Plan from 1/2/2020 - Patient with Crohn's colitis who is now on Entyvio infusions. She is doing well clinically.    Bloodwork was sent for CBC, Chem-matthias, TSH, ESR, C-reactive protein, iron studies, B12, folate, vitamin D.    Patient will go for Entyvio levels and antibody blood testing prior to her next infusion in February.    A colonoscopy has been scheduled. The risks, benefits, alternatives, and limitations of the procedure, including the possibility of missed lesions, were explained. The patient will require anesthesia evaluation given her BMI of 40.64.      Plan from 5/1/2019 - Patient with Crohn's colitis who has developed antibodies to Humira with undetectable levels. Hand in hand with this, the patient's inflammatory markers are rising. She is doing relatively well clinically although she has fecal urgency.    I had a long discussion with the patient regarding other options. She definitely needs to switch to a different biologic. We agreed to pursue Entyvio infusions. I discussed potential risks.    We will begin the insurance verification process and get the patient connected with an infusion center.    Blood work was sent for quantiferrin gold and hepatitis B and C. serologies.    The patient has started vitamin D 3 2000 international units q.d.    Patient has an appointment with her endocrinologist regarding the very low TSH.    Patient is due for colonoscopy in July.      Plan from 4/10/19 - Patient with Crohn's colitis on Humira.    We will draw labs at trough levels of Humira in one and a half weeks. These will include Humira levels and antibodies, CBC, Chem-matthias, TSH, ESR, C-reactive protein, iron studies, B12, folate, vitamin D.    I have renewed hydrocortisone cream for her hemorrhoids.    Patient will return to see me in July. She is due for a colonoscopy at that time.      Note from 11/19/18 - Patient with Crohn's colitis who has begun Humira. She is currently doing well.    Patient will continue her current medications.    Blood work was sent for CBC, chem pack, sedimentation rate, C-reactive protein, iron studies, B12, folate.    Patient will return to see me in 3 months.      Plan from 10/19/18 - Patient with Crohn's colitis with active inflammation on previous colonoscopy and elevated inflammatory markers on recent blood work. She has had a worsening of her symptoms. At her last visit, we had discussed the possibility of beginning a biologic treatment but she decided to hold off at that time. The patient reports that her gynecologist had concerns regarding impact of the Crohn's disease on gynecologic organs although the patient has no symptoms referable to the urinary tract or any symptoms of fecal output vaginally. The patient has left lower quadrant tenderness on exam.    A long discussion with the patient regarding biologic use. We agreed to begin Humira after discussion regarding the possible risks. We have begun the process of establishing the patient with a specialty pharmacy and beginning insurance verification.    Patient was sent for a CT scan of the abdomen and pelvis to rule out any fistula or abscess.      Plan from 8/31/18 - Patient with evidence of Crohn's colitis on colonoscopy with the presence of granulomas on multiple biopsies. The patient is doing well clinically on Apriso bypass active inflammation throughout the colon and elevated inflammatory markers on blood work.    I had a long detailed discussion with the patient regarding our options at this point. We could choose to continue Apriso and altered treatment only if her symptoms change or she showed evidence of a flareup. Alternatively, we can consider a biologic as the patient does have active inflammation.    Blood work was sent for CBC, chem PAC, sedimentation rate, C-reactive protein, B12, folate, iron studies, hepatitis B. and C. serologies, quantiferrin gold.    At the present time, the patient will continue Apriso.    We will repeat a colonoscopy in one year that is July 2019.    Patient will return to see me in 2 months.      Plan from 6/18/18 - Patient with ulcerative colitis doing well clinically.    A colonoscopy has been scheduled. The risks, benefits, alternatives, and limitations of the procedure, including the possibility of missed lesions, were explained.    Blood work was sent for CBC, chem pack, sedimentation rate, C-reactive protein, iron studies, B12, folate.      Plan from 2/2/18 - Patient doing better after a flareup of her ulcerative colitis. She did not require prednisone. She is now doing well on Apriso.    Patient will continue Apriso 4 tablets a day.    Blood work was sent for CBC, chem pack, sedimentation rate, C-reactive protein, iron studies, B12, folate.    Patient will return to see me in 3 months. She is due for colonoscopy in June.      Plan from 1/2/18 - Patient with a flareup of ulcerative colitis. She is starting to improve on Apriso. It is possible that the patient's hyperthyroidism is partially contributing to the diarrhea symptoms.    Patient will continue Apriso for tablets a day.    At this point, the patient was advised to stay off of prednisone.    Patient was given hydrocortisone cream 2.5% to apply to hemorrhoids.    Patient was advised to proceed with radioactive iodine to treat the hyperthyroidism.    Patient will return to see me in one month. We will plan on a colonoscopy in June.      Plan from 11/28/17 - Patient with probable ulcerative colitis who has symptoms of diarrhea with bleeding despite being on Lialda.    Stool studies will be sent for PCR testing, C. diff, lactoferrin, and calprotectin.    Blood work was sent for CBC, sedimentation rate, C-reactive protein, TFTs.    If the above are consistent with ulcerative colitis, the patient will require a short course of steroids.      Plan from 8/28/17 - Patient with a pancolitis on colonoscopy. We do not have a clear understanding of whether or not she has true inflammatory bowel disease. Her markers are negative.    Patient will continue on Lialda 4.8 g a day.    Patient will return to see me in one month.    Colonoscopy in June 2018.      Plan from 7/5/17 - Patient with pancolitis on colonoscopy. The biopsy findings along with the positive calprotectin and lactoferrin suggests the possibility of IBD. The patient had mild elevation of her LFTs but these have normalized.    Blood work was sent for IBD serology, CBC, sedimentation rate, C-reactive protein.    Patient was started on Lialda 4.8 g a day.    We will repeat a colonoscopy in one year that is June 2018.    Patient has been following with her endocrinologist regarding her hyperthyroidism.    Patient will return to see me in one month.

## 2024-05-04 NOTE — CONSULT LETTER
[FreeTextEntry1] : Dear Dr. Shelton Leavitt ,\par  \par  I had the pleasure of seeing your patient MARY ALICE REMY in the office today.  My office note is attached. PLEASE READ THE "ASSESSMENT" SECTION OF THE NOTE TO SEE MY IMPRESSION AND PLAN.\par  \par  Thank you very much for allowing me to participate in the care of your patient.\par  \par  Sincerely,\par  \par  Vince Lizarraga M.D., FAC, FACP\par  Director, Celiac Program at [facility]\par   of Medicine, [employer] at \[facility]\""/[facility]  Adjunct  of Medicine, [employer]\[facility]  Practice Director, [facility] - Gastroenterology at [facility]  [address] - [address]\[place] [address]\par  Tel: [phone]\par  Email: [email or web address]  \par  \par  The attached note has been created using a voice recognition system (Dragon).  There may be some misspellings and typos.  Please call my office if you have any issues or questions.
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
## 2024-05-06 DIAGNOSIS — K50.90 CROHN'S DISEASE, UNSPECIFIED, W/OUT COMPLICATIONS: ICD-10-CM

## 2024-05-06 LAB
25(OH)D3 SERPL-MCNC: 37.6 NG/ML
ALBUMIN SERPL ELPH-MCNC: 4.4 G/DL
ALP BLD-CCNC: 83 U/L
ALT SERPL-CCNC: 16 U/L
ANION GAP SERPL CALC-SCNC: 13 MMOL/L
AST SERPL-CCNC: 22 U/L
BASOPHILS # BLD AUTO: 0.06 K/UL
BASOPHILS NFR BLD AUTO: 0.5 %
BILIRUB SERPL-MCNC: 0.3 MG/DL
BUN SERPL-MCNC: 12 MG/DL
CALCIUM SERPL-MCNC: 10 MG/DL
CHLORIDE SERPL-SCNC: 103 MMOL/L
CO2 SERPL-SCNC: 25 MMOL/L
CREAT SERPL-MCNC: 0.78 MG/DL
CRP SERPL-MCNC: 8 MG/L
EGFR: 86 ML/MIN/1.73M2
EOSINOPHIL # BLD AUTO: 0.39 K/UL
EOSINOPHIL NFR BLD AUTO: 3.5 %
ERYTHROCYTE [SEDIMENTATION RATE] IN BLOOD BY WESTERGREN METHOD: 45 MM/HR
FERRITIN SERPL-MCNC: 202 NG/ML
FOLATE SERPL-MCNC: >20 NG/ML
GLUCOSE SERPL-MCNC: 85 MG/DL
HBV CORE IGG+IGM SER QL: NONREACTIVE
HBV SURFACE AB SER QL: NONREACTIVE
HBV SURFACE AG SER QL: NONREACTIVE
HCT VFR BLD CALC: 46.1 %
HCV AB SER QL: NONREACTIVE
HCV S/CO RATIO: 0.14 S/CO
HGB BLD-MCNC: 15 G/DL
IMM GRANULOCYTES NFR BLD AUTO: 0.4 %
IRON SATN MFR SERPL: 21 %
IRON SERPL-MCNC: 70 UG/DL
LYMPHOCYTES # BLD AUTO: 2.85 K/UL
LYMPHOCYTES NFR BLD AUTO: 25.4 %
M TB IFN-G BLD-IMP: ABNORMAL
MAN DIFF?: NORMAL
MCHC RBC-ENTMCNC: 28.4 PG
MCHC RBC-ENTMCNC: 32.5 GM/DL
MCV RBC AUTO: 87.1 FL
MONOCYTES # BLD AUTO: 0.86 K/UL
MONOCYTES NFR BLD AUTO: 7.7 %
NEUTROPHILS # BLD AUTO: 7.01 K/UL
NEUTROPHILS NFR BLD AUTO: 62.5 %
PLATELET # BLD AUTO: 307 K/UL
POTASSIUM SERPL-SCNC: 4.3 MMOL/L
PROT SERPL-MCNC: 7.4 G/DL
QUANTIFERON TB PLUS MITOGEN MINUS NIL: 0.05 IU/ML
QUANTIFERON TB PLUS NIL: 0.02 IU/ML
QUANTIFERON TB PLUS TB1 MINUS NIL: 0 IU/ML
QUANTIFERON TB PLUS TB2 MINUS NIL: 0 IU/ML
RBC # BLD: 5.29 M/UL
RBC # FLD: 13.9 %
SODIUM SERPL-SCNC: 141 MMOL/L
TIBC SERPL-MCNC: 340 UG/DL
TSH SERPL-ACNC: 0.25 UIU/ML
UIBC SERPL-MCNC: 270 UG/DL
VIT B12 SERPL-MCNC: 537 PG/ML
WBC # FLD AUTO: 11.21 K/UL

## 2024-05-06 RX ORDER — RISANKIZUMAB-RZAA 360 MG/2.4
360 KIT SUBCUTANEOUS
Qty: 1 | Refills: 3 | Status: ACTIVE | COMMUNITY
Start: 2024-05-06 | End: 1900-01-01

## 2024-05-09 LAB
M TB IFN-G BLD-IMP: NEGATIVE
QUANTIFERON TB PLUS MITOGEN MINUS NIL: >10 IU/ML
QUANTIFERON TB PLUS NIL: 0.02 IU/ML
QUANTIFERON TB PLUS TB1 MINUS NIL: 0 IU/ML
QUANTIFERON TB PLUS TB2 MINUS NIL: 0 IU/ML

## 2024-05-17 RX ORDER — RISANKIZUMAB-RZAA 60 MG/ML
600 INJECTION INTRAVENOUS
Refills: 0 | Status: ACTIVE | OUTPATIENT
Start: 2024-05-06 | End: 1900-01-01

## 2024-05-20 ENCOUNTER — APPOINTMENT (OUTPATIENT)
Dept: GASTROENTEROLOGY | Facility: CLINIC | Age: 64
End: 2024-05-20
Payer: COMMERCIAL

## 2024-05-20 PROCEDURE — 91110 GI TRC IMG INTRAL ESOPH-ILE: CPT | Mod: 26

## 2024-05-20 PROCEDURE — 91110 GI TRC IMG INTRAL ESOPH-ILE: CPT | Mod: TC

## 2024-05-24 ENCOUNTER — NON-APPOINTMENT (OUTPATIENT)
Age: 64
End: 2024-05-24

## 2024-05-24 ENCOUNTER — RESULT REVIEW (OUTPATIENT)
Age: 64
End: 2024-05-24

## 2024-05-28 ENCOUNTER — APPOINTMENT (OUTPATIENT)
Dept: RADIOLOGY | Facility: CLINIC | Age: 64
End: 2024-05-28
Payer: COMMERCIAL

## 2024-05-28 ENCOUNTER — OUTPATIENT (OUTPATIENT)
Dept: OUTPATIENT SERVICES | Facility: HOSPITAL | Age: 64
LOS: 1 days | End: 2024-05-28
Payer: COMMERCIAL

## 2024-05-28 DIAGNOSIS — Z00.8 ENCOUNTER FOR OTHER GENERAL EXAMINATION: ICD-10-CM

## 2024-05-28 PROCEDURE — 74018 RADEX ABDOMEN 1 VIEW: CPT | Mod: 26

## 2024-05-28 PROCEDURE — 74018 RADEX ABDOMEN 1 VIEW: CPT

## 2024-06-18 ENCOUNTER — TRANSCRIPTION ENCOUNTER (OUTPATIENT)
Age: 64
End: 2024-06-18

## 2024-06-24 ENCOUNTER — APPOINTMENT (OUTPATIENT)
Dept: RHEUMATOLOGY | Facility: CLINIC | Age: 64
End: 2024-06-24
Payer: COMMERCIAL

## 2024-06-24 VITALS — SYSTOLIC BLOOD PRESSURE: 107 MMHG | HEART RATE: 70 BPM | DIASTOLIC BLOOD PRESSURE: 67 MMHG | OXYGEN SATURATION: 97 %

## 2024-06-24 VITALS
BODY MASS INDEX: 36.32 KG/M2 | TEMPERATURE: 98.1 F | HEART RATE: 72 BPM | RESPIRATION RATE: 16 BRPM | WEIGHT: 225 LBS | DIASTOLIC BLOOD PRESSURE: 66 MMHG | OXYGEN SATURATION: 96 % | SYSTOLIC BLOOD PRESSURE: 115 MMHG

## 2024-06-24 PROCEDURE — 96365 THER/PROPH/DIAG IV INF INIT: CPT

## 2024-06-24 RX ORDER — RISANKIZUMAB-RZAA 60 MG/ML
600 INJECTION INTRAVENOUS
Qty: 0 | Refills: 0 | Status: COMPLETED
Start: 2024-05-06

## 2024-07-22 ENCOUNTER — APPOINTMENT (OUTPATIENT)
Dept: RHEUMATOLOGY | Facility: CLINIC | Age: 64
End: 2024-07-22
Payer: COMMERCIAL

## 2024-07-22 VITALS
SYSTOLIC BLOOD PRESSURE: 129 MMHG | OXYGEN SATURATION: 94 % | TEMPERATURE: 98.1 F | HEART RATE: 72 BPM | DIASTOLIC BLOOD PRESSURE: 71 MMHG | RESPIRATION RATE: 16 BRPM

## 2024-07-22 VITALS — DIASTOLIC BLOOD PRESSURE: 70 MMHG | HEART RATE: 66 BPM | OXYGEN SATURATION: 98 % | SYSTOLIC BLOOD PRESSURE: 105 MMHG

## 2024-07-22 PROCEDURE — 96365 THER/PROPH/DIAG IV INF INIT: CPT

## 2024-07-22 RX ORDER — RISANKIZUMAB-RZAA 60 MG/ML
600 INJECTION INTRAVENOUS
Qty: 0 | Refills: 0 | Status: COMPLETED
Start: 2024-05-06

## 2024-07-22 NOTE — HISTORY OF PRESENT ILLNESS
[Denies] : Denies [No] : No [Yes] : Yes [Declined] : Declined [24g] : 24g [Start Time: ___] : Medication Start Time: [unfilled] [End Time: ___] : Medication End Time: [unfilled] [Medication Name: ___] : Medication Name: [unfilled] [Total Amount Administered: ___] : Total Amount Administered: [unfilled] [IV discontinued. Intact. No signs or symptoms of IV complications noted. Time: ___] : IV discontinued. Intact. No signs or symptoms of IV complications noted. Time: [unfilled] [Patient  instructed to seek medical attention with signs and symptoms of adverse effects] : Patient  instructed to seek medical attention with signs and symptoms of adverse effects [Patient left unit in no acute distress] : Patient left unit in no acute distress [Medications administered as ordered and tolerated well.] : Medications administered as ordered and tolerated well. [Blood drawn at time of visit] : Blood drawn at time of visit [de-identified] : right hand dorsal vein  [de-identified] : Patient presents for week 4 Skyrizi infusion, doing well overall. Patient reports having last infusion without any AEs. Patient reports having 4-5 BMs daily, stool with some mucus but no blood. Patient continues to report having "anal spasm" and urgency. Denies pain, cramping, bloating and fistula. Patient denies any recent infection or antibiotic use. Patient tolerated infusion well and left unit ambulatory in [facility].

## 2024-08-19 ENCOUNTER — APPOINTMENT (OUTPATIENT)
Dept: RHEUMATOLOGY | Facility: CLINIC | Age: 64
End: 2024-08-19
Payer: COMMERCIAL

## 2024-08-19 VITALS
OXYGEN SATURATION: 96 % | SYSTOLIC BLOOD PRESSURE: 128 MMHG | RESPIRATION RATE: 16 BRPM | DIASTOLIC BLOOD PRESSURE: 74 MMHG | HEART RATE: 72 BPM

## 2024-08-19 VITALS — HEART RATE: 69 BPM | OXYGEN SATURATION: 96 % | SYSTOLIC BLOOD PRESSURE: 114 MMHG | DIASTOLIC BLOOD PRESSURE: 72 MMHG

## 2024-08-19 PROCEDURE — 96365 THER/PROPH/DIAG IV INF INIT: CPT

## 2024-08-19 RX ORDER — RISANKIZUMAB-RZAA 60 MG/ML
600 INJECTION INTRAVENOUS
Qty: 0 | Refills: 0 | Status: COMPLETED
Start: 2024-05-06

## 2024-08-19 NOTE — HISTORY OF PRESENT ILLNESS
[Denies] : Denies [No] : No [Yes] : Yes [Declined] : Declined [Informed consent documented in EHR.] : Informed consent documented in EHR. [Right upper extremity] : Right upper extremity [24g] : 24g [Start Time: ___] : Medication Start Time: [unfilled] [End Time: ___] : Medication End Time: [unfilled] [Medication Name: ___] : Medication Name: [unfilled] [Total Amount Administered: ___] : Total Amount Administered: [unfilled] [IV discontinued. Intact. No signs or symptoms of IV complications noted. Time: ___] : IV discontinued. Intact. No signs or symptoms of IV complications noted. Time: [unfilled] [Patient  instructed to seek medical attention with signs and symptoms of adverse effects] : Patient  instructed to seek medical attention with signs and symptoms of adverse effects [Patient left unit in no acute distress] : Patient left unit in no acute distress [Medications administered as ordered and tolerated well.] : Medications administered as ordered and tolerated well. [de-identified] : right hand dorsal vein  [de-identified] : Patient presents for week 8 Skyrizi infusion, doing well overall. Patient reports having 2-3 BMs daily, stool with some mucus but no blood. Patient continues to report having urgency and frequency. Patient denies bloating or n/v. Patient reports int. abdominal cramping. Patient denies any recent infection, ABX use or hospitalizations. Patient denies any other concerns or symptoms. Patient tolerated prescribed infusion well. Patient left ambulatory in [facility].
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
## 2024-12-26 ENCOUNTER — RX RENEWAL (OUTPATIENT)
Age: 64
End: 2024-12-26

## 2025-01-30 ENCOUNTER — APPOINTMENT (OUTPATIENT)
Dept: GASTROENTEROLOGY | Facility: CLINIC | Age: 65
End: 2025-01-30
Payer: COMMERCIAL

## 2025-01-30 VITALS
BODY MASS INDEX: 38.25 KG/M2 | WEIGHT: 238 LBS | DIASTOLIC BLOOD PRESSURE: 68 MMHG | HEART RATE: 70 BPM | HEIGHT: 66 IN | TEMPERATURE: 97.5 F | SYSTOLIC BLOOD PRESSURE: 100 MMHG | OXYGEN SATURATION: 99 %

## 2025-01-30 DIAGNOSIS — K50.90 CROHN'S DISEASE, UNSPECIFIED, W/OUT COMPLICATIONS: ICD-10-CM

## 2025-01-30 PROCEDURE — 99214 OFFICE O/P EST MOD 30 MIN: CPT

## 2025-01-30 RX ORDER — TIMOLOL MALEATE 6.8 MG/ML
SOLUTION OPHTHALMIC
Refills: 0 | Status: ACTIVE | COMMUNITY

## 2025-01-30 RX ORDER — SODIUM PICOSULFATE, MAGNESIUM OXIDE, AND ANHYDROUS CITRIC ACID 12; 3.5; 1 G/175ML; G/175ML; MG/175ML
10-3.5-12 MG-GM LIQUID ORAL
Qty: 2 | Refills: 0 | Status: ACTIVE | COMMUNITY
Start: 2025-01-30 | End: 1900-01-01

## 2025-02-02 LAB
25(OH)D3 SERPL-MCNC: 40.3 NG/ML
ALBUMIN SERPL ELPH-MCNC: 4.5 G/DL
ALP BLD-CCNC: 80 U/L
ALT SERPL-CCNC: 14 U/L
ANION GAP SERPL CALC-SCNC: 13 MMOL/L
AST SERPL-CCNC: 23 U/L
BASOPHILS # BLD AUTO: 0.07 K/UL
BASOPHILS NFR BLD AUTO: 0.7 %
BILIRUB SERPL-MCNC: 0.2 MG/DL
BUN SERPL-MCNC: 9 MG/DL
CALCIUM SERPL-MCNC: 9.4 MG/DL
CHLORIDE SERPL-SCNC: 98 MMOL/L
CO2 SERPL-SCNC: 26 MMOL/L
CREAT SERPL-MCNC: 0.76 MG/DL
CRP SERPL-MCNC: 5 MG/L
EGFR: 88 ML/MIN/1.73M2
EOSINOPHIL # BLD AUTO: 0.41 K/UL
EOSINOPHIL NFR BLD AUTO: 4.1 %
ERYTHROCYTE [SEDIMENTATION RATE] IN BLOOD BY WESTERGREN METHOD: 50 MM/HR
FERRITIN SERPL-MCNC: 182 NG/ML
FOLATE SERPL-MCNC: >20 NG/ML
GGT SERPL-CCNC: 20 U/L
GLUCOSE SERPL-MCNC: 81 MG/DL
HCT VFR BLD CALC: 46 %
HGB BLD-MCNC: 14.1 G/DL
IMM GRANULOCYTES NFR BLD AUTO: 0.3 %
IRON SATN MFR SERPL: 19 %
IRON SERPL-MCNC: 63 UG/DL
LYMPHOCYTES # BLD AUTO: 2.8 K/UL
LYMPHOCYTES NFR BLD AUTO: 28.2 %
MAN DIFF?: NORMAL
MCHC RBC-ENTMCNC: 28.2 PG
MCHC RBC-ENTMCNC: 30.7 G/DL
MCV RBC AUTO: 92 FL
MONOCYTES # BLD AUTO: 0.76 K/UL
MONOCYTES NFR BLD AUTO: 7.6 %
NEUTROPHILS # BLD AUTO: 5.87 K/UL
NEUTROPHILS NFR BLD AUTO: 59.1 %
PLATELET # BLD AUTO: 292 K/UL
POTASSIUM SERPL-SCNC: 3.8 MMOL/L
PROT SERPL-MCNC: 7.1 G/DL
RBC # BLD: 5 M/UL
RBC # FLD: 13.2 %
SODIUM SERPL-SCNC: 137 MMOL/L
TIBC SERPL-MCNC: 326 UG/DL
TSH SERPL-ACNC: 0.71 UIU/ML
UIBC SERPL-MCNC: 263 UG/DL
VIT B12 SERPL-MCNC: 467 PG/ML
WBC # FLD AUTO: 9.94 K/UL

## 2025-04-21 ENCOUNTER — RX RENEWAL (OUTPATIENT)
Age: 65
End: 2025-04-21

## 2025-05-20 ENCOUNTER — APPOINTMENT (OUTPATIENT)
Dept: ORTHOPEDIC SURGERY | Facility: CLINIC | Age: 65
End: 2025-05-20
Payer: COMMERCIAL

## 2025-05-20 DIAGNOSIS — M54.50 LOW BACK PAIN, UNSPECIFIED: ICD-10-CM

## 2025-05-20 PROCEDURE — 72170 X-RAY EXAM OF PELVIS: CPT

## 2025-05-20 PROCEDURE — 99215 OFFICE O/P EST HI 40 MIN: CPT

## 2025-05-20 PROCEDURE — 72110 X-RAY EXAM L-2 SPINE 4/>VWS: CPT

## 2025-05-20 PROCEDURE — 72070 X-RAY EXAM THORAC SPINE 2VWS: CPT

## 2025-05-20 RX ORDER — METHYLPREDNISOLONE 4 MG/1
4 TABLET ORAL
Qty: 1 | Refills: 0 | Status: ACTIVE | COMMUNITY
Start: 2025-05-20 | End: 1900-01-01

## 2025-05-20 RX ORDER — CYCLOBENZAPRINE HYDROCHLORIDE 5 MG/1
5 TABLET, FILM COATED ORAL
Qty: 45 | Refills: 1 | Status: ACTIVE | COMMUNITY
Start: 2025-05-20 | End: 1900-01-01

## 2025-06-06 ENCOUNTER — APPOINTMENT (OUTPATIENT)
Dept: ORTHOPEDIC SURGERY | Facility: CLINIC | Age: 65
End: 2025-06-06

## 2025-06-09 ENCOUNTER — APPOINTMENT (OUTPATIENT)
Dept: GASTROENTEROLOGY | Facility: AMBULATORY MEDICAL SERVICES | Age: 65
End: 2025-06-09
Payer: COMMERCIAL

## 2025-06-09 PROCEDURE — 45380 COLONOSCOPY AND BIOPSY: CPT

## 2025-07-01 ENCOUNTER — APPOINTMENT (OUTPATIENT)
Dept: ORTHOPEDIC SURGERY | Facility: CLINIC | Age: 65
End: 2025-07-01
Payer: COMMERCIAL

## 2025-07-01 PROCEDURE — 99214 OFFICE O/P EST MOD 30 MIN: CPT

## 2025-09-02 ENCOUNTER — APPOINTMENT (OUTPATIENT)
Dept: ORTHOPEDIC SURGERY | Facility: CLINIC | Age: 65
End: 2025-09-02
Payer: COMMERCIAL

## 2025-09-02 DIAGNOSIS — M54.50 LOW BACK PAIN, UNSPECIFIED: ICD-10-CM

## 2025-09-02 PROCEDURE — 99214 OFFICE O/P EST MOD 30 MIN: CPT
